# Patient Record
Sex: FEMALE | Race: WHITE | NOT HISPANIC OR LATINO | Employment: OTHER | URBAN - NONMETROPOLITAN AREA
[De-identification: names, ages, dates, MRNs, and addresses within clinical notes are randomized per-mention and may not be internally consistent; named-entity substitution may affect disease eponyms.]

---

## 2021-01-15 ENCOUNTER — IMMUNIZATION (OUTPATIENT)
Dept: VACCINE CLINIC | Facility: HOSPITAL | Age: 56
End: 2021-01-15

## 2021-01-15 PROCEDURE — 0002A: CPT | Performed by: FAMILY MEDICINE

## 2021-01-15 PROCEDURE — 0001A: CPT | Performed by: FAMILY MEDICINE

## 2021-01-15 PROCEDURE — 91300 HC SARSCOV02 VAC 30MCG/0.3ML IM: CPT | Performed by: FAMILY MEDICINE

## 2021-02-05 ENCOUNTER — IMMUNIZATION (OUTPATIENT)
Dept: VACCINE CLINIC | Facility: HOSPITAL | Age: 56
End: 2021-02-05

## 2021-02-05 PROCEDURE — 0002A: CPT | Performed by: INTERNAL MEDICINE

## 2021-02-05 PROCEDURE — 91300 HC SARSCOV02 VAC 30MCG/0.3ML IM: CPT | Performed by: INTERNAL MEDICINE

## 2022-10-25 ENCOUNTER — DOCUMENTATION (OUTPATIENT)
Dept: ONCOLOGY | Facility: HOSPITAL | Age: 57
End: 2022-10-25

## 2022-10-25 NOTE — PROGRESS NOTES
SS received call from patient [phone] stating that she has been diagnosed with Breast Cancer and plans to receive treatment at [facility].    Patient request Dr. Porras for provider.  SS contacted Renetta Monsivais ext [phone] to make aware.    SS will follow as needed.

## 2022-11-09 ENCOUNTER — DOCUMENTATION (OUTPATIENT)
Dept: ONCOLOGY | Facility: HOSPITAL | Age: 57
End: 2022-11-09

## 2022-11-09 NOTE — PROGRESS NOTES
SS received call from patient this date inquiring about doctor appointment.     SS will follow patient for psychosocial needs and resources as needed.

## 2022-11-14 ENCOUNTER — NURSE NAVIGATOR (OUTPATIENT)
Dept: ONCOLOGY | Facility: CLINIC | Age: 57
End: 2022-11-14

## 2022-11-14 NOTE — PROGRESS NOTES
NAME: Ubaldo Thomas    : 1965    DATE OF CONSULTATION: 11/15/2022    REASON FOR REFERRAL: Breast Cancer     REFERRING PHYSICIAN:  Dr. Elva Jackson     CHIEF COMPLAINT:  Breast cancer      HISTORY OF PRESENT ILLNESS:   Ubaldo Thomas is a very pleasant 57 y.o. female who is being seen today in the presence of her daughter and sister-in-law at the request of Dr. Elva Jackson for evaluation and treatment of breast cancer.  She was in her usual state of health when she underwent screening mammogram as below which was abnormal.  She had a biopsy on 2022 revealing an invasive mammary carcinoma, grade 2 with associated DCIS.  Tumor was ER/KY positive and HER2/leonor negative.  She underwent genetic testing through [employer] which showed no genetic mutations.  On 10/17/2022 Dr. Jackson took her for left lumpectomy and sentinel lymph node biopsy.  Small tumor was resected as below and margins were clear for invasive ductal carcinoma, but she did have involvement of the anterior margin by DCIS.  She underwent revision on 10/26/2022 with clearance of margins.  She is referred here for discussion of further therapy.    Ms. Thomas is overall doing well.  She denies weight loss, chest pain, shortness of breath, abdominal pain, nausea, vomiting, diarrhea or constipation.  She denies rectal bleeding.  She denies bone pain, headaches or visual change.    PAST MEDICAL HISTORY:  Past Medical History:   Diagnosis Date   • Bell's palsy    • Breast cancer (HCC)    • Kidney stones    • Skin cancer        Risk Factors:  Age of Menarche: 13 years old  Age of Menopause: 40s  Prior Breast Disease: She has history of fibrocystic breast disease but no history of abnormal mammograms or biopsies  Pregnancies:    Age of 1st pregnancy: 29 years old  Family History of Breast Cancer: None  Family History of Ovarian Cancer: Maternal grandmother had some type of malignancy in her 60s which was possibly ovarian cancer although this  "is uncertain  History of HRT use: None        PAST SURGICAL HISTORY:  Past Surgical History:   Procedure Laterality Date   • BREAST LUMPECTOMY     • KIDNEY STONE SURGERY     - History of removal of melanoma in situ involving right posterior thigh  - 8/14/2022 underwent surgery for left facial nerve reanimation for treatment of Bell's palsy.    FAMILY HISTORY:  Family History   Problem Relation Age of Onset   • Diabetes Mother    • Heart disease Father        SOCIAL HISTORY:  Social History     Socioeconomic History   • Marital status:    Tobacco Use   • Smoking status: Never   Vaping Use   • Vaping Use: Never used   Substance and Sexual Activity   • Alcohol use: Yes     Comment: occasionally   • Drug use: Never   • Sexual activity: Defer         REVIEW OF SYSTEMS:   A comprehensive 14 point review of systems was performed.  Significant findings as mentioned above.  All other systems reviewed and are negative.      MEDICATIONS:  The current medication list was reviewed in the EMR  No current outpatient medications on file.    ALLERGIES:    Allergies   Allergen Reactions   • Dexamethasone Arrhythmia       PHYSICAL EXAM:  Vitals:    11/15/22 1506   BP: 136/81   Pulse: 91   Resp: 20   Temp: 97.7 °F (36.5 °C)   TempSrc: Temporal   SpO2: 100%   Weight: 67.1 kg (148 lb)   Height: 160 cm (63\")   PainSc: 0-No pain     Body surface area is 1.7 meters squared.   Body mass index is 26.22 kg/m².      ECOG score: 0     PHQ-9 Total Score: 0    General:  Awake, alert and oriented, in no distress  HEENT:  Pupils are equal, round and reactive to light and accommodation, Extra-ocular movements full, Oropharyx clear, mucous membranes mois.  She has asymmetry of the face consistent with left sided Bell's palsy.  Neck:  No JVD, thyromegaly or lymphadenopathy  CV:  Regular rate and rhythm, no murmurs, rubs or gallops  Resp:  Lungs are clear to auscultation bilaterally  Breast: Status post left lumpectomy and revision as well as " left sentinel lymph node biopsy.  She has some firmer tissue around the sites of her surgeries as well as what appears to be an old biopsy site.  All is healing well.  No mass lesions.  No left axillary adenopathy.  Right breast without mass lesions.  Right axilla without adenopathy.  Abd:  Soft, non-tender, non-distended, bowel sounds present, no organomegaly or masses  Ext:  No clubbing, cyanosis or edema  Lymph:  No cervical, supraclavicular, axillary, inguinal or femoral adenopathy  Neuro:  MS as above, CN II-XII intact, grossly non-focal exam      PATHOLOGY:    09/28/2022            FISH Analysis 09/28/2022      10/17/2022      10/26/2022      Genetic Testing 11/07/2022          ENDOSCOPY:        IMAGING:    Bilateral Diagnostic Mammogram with Bilateral Breast Ultrasound ([facility]) 09/27/2022          Bilateral Breast MRI ([facility]) 09/30/2022          RECENT LABS:  Lab Results   Component Value Date    WBC 7.79 11/15/2022    HGB 14.8 11/15/2022    HCT 44.9 11/15/2022    MCV 93.5 11/15/2022    RDW 12.8 11/15/2022     11/15/2022    NEUTRORELPCT 60.4 11/15/2022    LYMPHORELPCT 27.9 11/15/2022    MONORELPCT 8.9 11/15/2022    EOSRELPCT 1.9 11/15/2022    BASORELPCT 0.6 11/15/2022    NEUTROABS 4.71 11/15/2022    LYMPHSABS 2.17 11/15/2022       Lab Results   Component Value Date     11/15/2022    K 3.9 11/15/2022    CO2 24.1 11/15/2022     11/15/2022    BUN 13 11/15/2022    CREATININE 0.72 11/15/2022    GLUCOSE 105 (H) 11/15/2022    CALCIUM 9.8 11/15/2022    ALKPHOS 108 11/15/2022    AST 22 11/15/2022    ALT 21 11/15/2022    BILITOT 0.5 11/15/2022    ALBUMIN 4.77 11/15/2022    PROTEINTOT 7.9 11/15/2022           ASSESSMENT & PLAN:  Ubaldo Thomas is a very pleasant 57 y.o. female with recently diagnosed Stage IA (pT1bN0(sn)MX) moderately differentiated invasive ductal carcinoma of the L breast.  Tumor was 6 mm in maximal dimension with associated DCIS.  Initial margin positive for DCIS with revision margins  now clear.  0/2 SLN involved.  Tumor was 88% 3+ ER+, 86 % 3+ SD+ and HER-2/Frances Negative (1+) with negative FISH testing.    1.  Left Breast Cancer:  -  S/p L Lumpectomy 10-17-22 with SLNBx with revision of margin on 10-26-22 for involvement with DCIS.  Surgical margins clear.    -  Overall her tumor has favorable characteristics including strong hormonal positivity.  -  Given tumor size just >5 mm, I have recommended we send genomic profiling to determine whether risk is sufficient to necessitate treatment with chemotherapy.  If high risk, would recommend 4 cycles of TC . If low risk, would proceed to radiation (could potentially be a candidate for accelerated partial breast irradiation) and will then require hormonal therapy after radiation.  -  Will order baseline labwork today.'  - Will order baseline DEXA scan in preparation for hormonal therapy    2. Prophylaxis:  -  Ubaldo Thomas received 2022 flu vaccine.  -  Ubaldo Thomas has not had Prevnar vaccine.  -  Ubaldo Thomas received 4 doses COVID vaccine. and received bivalent booster.  -  Ubaldo Thomas has had colonoscopy x 2 in the past (doesn't remember when and doesn't remember the result).  She says her PCP, Dr. Angelo Kingston would have a record of c-scopes which she says were performed by Dr. Archibald.  Will see if we can get a copy of records.    3.  ACO / KIARA/Other  Quality measures  -  Ubaldo Thomas received 2022 flu vaccine.  -  Ubaldo Thomas reports a pain score of 0.  Given her pain assessment as noted, treatment options were discussed and the following options were decided upon as a follow-up plan to address the patient's pain: No intervention needed at this time..  -  Current outpatient and discharge medications have been reconciled for the patient.  Reviewed by: Nidhi Porras MD     4.  F/u:  -  Check baseline labwork today  -  Check DEXA scan  -  Send tumor for Mammaprint testing.  -  Return in 2-3 weeks after Mammaprint has resulted  -   Attempt to get copy of C-scope records.       I spent 60 minutes with Ubaldo Thomas today.  In the office today, more than 50% of this time was spent face-to-face with her  in counseling / coordination of care, reviewing her medical history and counseling on the current treatment plan.  All questions were answered to her satisfaction      Electronically Signed by: Nidhi Porras MD      CC:   MD Angelo Us MD

## 2022-11-14 NOTE — PROGRESS NOTES
Called patient on this day. Patient has an upcoming appointment with Dr. Porras, and Nurse Navigator will not be able to attend. The role of the NN was introduced to patient.  I let patient know what to expect during visit and that we offer support groups for breast cancer patients. NN contact information was provided and encouraged patient to call with any questions or concerns. Patient verbalized understanding and was appreciative of the call. NN will follow and assist PRN.

## 2022-11-15 ENCOUNTER — CONSULT (OUTPATIENT)
Dept: ONCOLOGY | Facility: CLINIC | Age: 57
End: 2022-11-15

## 2022-11-15 VITALS
BODY MASS INDEX: 26.22 KG/M2 | SYSTOLIC BLOOD PRESSURE: 136 MMHG | HEART RATE: 91 BPM | OXYGEN SATURATION: 100 % | WEIGHT: 148 LBS | TEMPERATURE: 97.7 F | DIASTOLIC BLOOD PRESSURE: 81 MMHG | HEIGHT: 63 IN | RESPIRATION RATE: 20 BRPM

## 2022-11-15 DIAGNOSIS — C50.212 MALIGNANT NEOPLASM OF UPPER-INNER QUADRANT OF LEFT BREAST IN FEMALE, ESTROGEN RECEPTOR POSITIVE: Primary | ICD-10-CM

## 2022-11-15 DIAGNOSIS — Z17.0 MALIGNANT NEOPLASM OF LEFT BREAST IN FEMALE, ESTROGEN RECEPTOR POSITIVE, UNSPECIFIED SITE OF BREAST: Primary | ICD-10-CM

## 2022-11-15 DIAGNOSIS — C50.912 MALIGNANT NEOPLASM OF LEFT BREAST IN FEMALE, ESTROGEN RECEPTOR POSITIVE, UNSPECIFIED SITE OF BREAST: Primary | ICD-10-CM

## 2022-11-15 DIAGNOSIS — E55.9 VITAMIN D DEFICIENCY: ICD-10-CM

## 2022-11-15 DIAGNOSIS — R53.83 FATIGUE, UNSPECIFIED TYPE: ICD-10-CM

## 2022-11-15 DIAGNOSIS — Z17.0 MALIGNANT NEOPLASM OF UPPER-INNER QUADRANT OF LEFT BREAST IN FEMALE, ESTROGEN RECEPTOR POSITIVE: Primary | ICD-10-CM

## 2022-11-15 LAB
ALBUMIN SERPL-MCNC: 4.77 G/DL (ref 3.5–5.2)
ALBUMIN/GLOB SERPL: 1.5 G/DL
ALP SERPL-CCNC: 108 U/L (ref 39–117)
ALT SERPL W P-5'-P-CCNC: 21 U/L (ref 1–33)
ANION GAP SERPL CALCULATED.3IONS-SCNC: 13.9 MMOL/L (ref 5–15)
AST SERPL-CCNC: 22 U/L (ref 1–32)
BASOPHILS # BLD AUTO: 0.05 10*3/MM3 (ref 0–0.2)
BASOPHILS NFR BLD AUTO: 0.6 % (ref 0–1.5)
BILIRUB SERPL-MCNC: 0.5 MG/DL (ref 0–1.2)
BUN SERPL-MCNC: 13 MG/DL (ref 6–20)
BUN/CREAT SERPL: 18.1 (ref 7–25)
CALCIUM SPEC-SCNC: 9.8 MG/DL (ref 8.6–10.5)
CHLORIDE SERPL-SCNC: 102 MMOL/L (ref 98–107)
CO2 SERPL-SCNC: 24.1 MMOL/L (ref 22–29)
CREAT SERPL-MCNC: 0.72 MG/DL (ref 0.57–1)
DEPRECATED RDW RBC AUTO: 43.8 FL (ref 37–54)
EGFRCR SERPLBLD CKD-EPI 2021: 97.1 ML/MIN/1.73
EOSINOPHIL # BLD AUTO: 0.15 10*3/MM3 (ref 0–0.4)
EOSINOPHIL NFR BLD AUTO: 1.9 % (ref 0.3–6.2)
ERYTHROCYTE [DISTWIDTH] IN BLOOD BY AUTOMATED COUNT: 12.8 % (ref 12.3–15.4)
FERRITIN SERPL-MCNC: 129.4 NG/ML (ref 13–150)
GLOBULIN UR ELPH-MCNC: 3.1 GM/DL
GLUCOSE SERPL-MCNC: 105 MG/DL (ref 65–99)
HCT VFR BLD AUTO: 44.9 % (ref 34–46.6)
HGB BLD-MCNC: 14.8 G/DL (ref 12–15.9)
IMM GRANULOCYTES # BLD AUTO: 0.02 10*3/MM3 (ref 0–0.05)
IMM GRANULOCYTES NFR BLD AUTO: 0.3 % (ref 0–0.5)
IRON 24H UR-MRATE: 57 MCG/DL (ref 37–145)
IRON SATN MFR SERPL: 16 % (ref 20–50)
LYMPHOCYTES # BLD AUTO: 2.17 10*3/MM3 (ref 0.7–3.1)
LYMPHOCYTES NFR BLD AUTO: 27.9 % (ref 19.6–45.3)
MCH RBC QN AUTO: 30.8 PG (ref 26.6–33)
MCHC RBC AUTO-ENTMCNC: 33 G/DL (ref 31.5–35.7)
MCV RBC AUTO: 93.5 FL (ref 79–97)
MONOCYTES # BLD AUTO: 0.69 10*3/MM3 (ref 0.1–0.9)
MONOCYTES NFR BLD AUTO: 8.9 % (ref 5–12)
NEUTROPHILS NFR BLD AUTO: 4.71 10*3/MM3 (ref 1.7–7)
NEUTROPHILS NFR BLD AUTO: 60.4 % (ref 42.7–76)
NRBC BLD AUTO-RTO: 0 /100 WBC (ref 0–0.2)
PLATELET # BLD AUTO: 319 10*3/MM3 (ref 140–450)
PMV BLD AUTO: 9.7 FL (ref 6–12)
POTASSIUM SERPL-SCNC: 3.9 MMOL/L (ref 3.5–5.2)
PROT SERPL-MCNC: 7.9 G/DL (ref 6–8.5)
RBC # BLD AUTO: 4.8 10*6/MM3 (ref 3.77–5.28)
SODIUM SERPL-SCNC: 140 MMOL/L (ref 136–145)
TIBC SERPL-MCNC: 350 MCG/DL (ref 298–536)
TRANSFERRIN SERPL-MCNC: 235 MG/DL (ref 200–360)
TSH SERPL DL<=0.05 MIU/L-ACNC: 2.43 UIU/ML (ref 0.27–4.2)
WBC NRBC COR # BLD: 7.79 10*3/MM3 (ref 3.4–10.8)

## 2022-11-15 PROCEDURE — 84466 ASSAY OF TRANSFERRIN: CPT | Performed by: INTERNAL MEDICINE

## 2022-11-15 PROCEDURE — 99205 OFFICE O/P NEW HI 60 MIN: CPT | Performed by: INTERNAL MEDICINE

## 2022-11-15 PROCEDURE — 80050 GENERAL HEALTH PANEL: CPT | Performed by: INTERNAL MEDICINE

## 2022-11-15 PROCEDURE — 86300 IMMUNOASSAY TUMOR CA 15-3: CPT | Performed by: INTERNAL MEDICINE

## 2022-11-15 PROCEDURE — 83540 ASSAY OF IRON: CPT | Performed by: INTERNAL MEDICINE

## 2022-11-15 PROCEDURE — 82607 VITAMIN B-12: CPT | Performed by: INTERNAL MEDICINE

## 2022-11-15 PROCEDURE — 82306 VITAMIN D 25 HYDROXY: CPT | Performed by: INTERNAL MEDICINE

## 2022-11-15 PROCEDURE — 82728 ASSAY OF FERRITIN: CPT | Performed by: INTERNAL MEDICINE

## 2022-11-15 PROCEDURE — 82746 ASSAY OF FOLIC ACID SERUM: CPT | Performed by: INTERNAL MEDICINE

## 2022-11-16 LAB
25(OH)D3 SERPL-MCNC: 28.8 NG/ML (ref 30–100)
CANCER AG15-3 SERPL-ACNC: 26.2 U/ML
FOLATE SERPL-MCNC: 9.82 NG/ML (ref 4.78–24.2)
VIT B12 BLD-MCNC: 594 PG/ML (ref 211–946)

## 2022-11-16 RX ORDER — ERGOCALCIFEROL 1.25 MG/1
50000 CAPSULE ORAL WEEKLY
Qty: 4 CAPSULE | Refills: 5 | Status: SHIPPED | OUTPATIENT
Start: 2022-11-16 | End: 2023-02-21 | Stop reason: SDUPTHER

## 2022-11-17 LAB — CANCER AG27-29 SERPL-ACNC: 31.1 U/ML (ref 0–38.6)

## 2022-12-08 ENCOUNTER — HOSPITAL ENCOUNTER (OUTPATIENT)
Dept: BONE DENSITY | Facility: HOSPITAL | Age: 57
Discharge: HOME | End: 2022-12-08
Admitting: INTERNAL MEDICINE

## 2022-12-08 DIAGNOSIS — Z17.0 MALIGNANT NEOPLASM OF LEFT BREAST IN FEMALE, ESTROGEN RECEPTOR POSITIVE, UNSPECIFIED SITE OF BREAST: ICD-10-CM

## 2022-12-08 DIAGNOSIS — C50.912 MALIGNANT NEOPLASM OF LEFT BREAST IN FEMALE, ESTROGEN RECEPTOR POSITIVE, UNSPECIFIED SITE OF BREAST: ICD-10-CM

## 2022-12-08 PROCEDURE — 77080 DXA BONE DENSITY AXIAL: CPT

## 2022-12-08 PROCEDURE — 77080 DXA BONE DENSITY AXIAL: CPT | Performed by: RADIOLOGY

## 2022-12-08 NOTE — PROGRESS NOTES
NAME: Ubaldo Thomas    : 1965    DATE OF CONSULTATION: 2022    DIAGNOSIS:   Stage IA (pT1bN0(sn)MX) moderately differentiated invasive ductal carcinoma of the L breast.  Tumor was 6 mm in maximal dimension with associated DCIS.  Initial margin positive for DCIS with revision margins now clear.  0/2 SLN involved.  Tumor was 88% 3+ ER+, 86 % 3+ NV+ and HER-2/Frances Negative (1+) with negative FISH testing.  Mammaprint Low risk Luminal-Type A.      TREATMENT HISTORY:    1.  L Lumpectomy, SLNBx (Dr. Jackson) 10-17-22  2.  Revision for clearance of margins 10-26-22      CHIEF COMPLAINT:  Breast cancer      HISTORY OF PRESENT ILLNESS:   Ubaldo Thomas is a very pleasant 57 y.o. female who was referred by Dr. Elva Jackson for evaluation and treatment of breast cancer.  She was in her usual state of health when she underwent screening mammogram as below which was abnormal.  She had a biopsy on 2022 revealing an invasive mammary carcinoma, grade 2 with associated DCIS.  Tumor was ER/NV positive and HER2/frances negative.  She underwent genetic testing through [employer] which showed no genetic mutations.  On 10/17/2022 Dr. Jackson took her for left lumpectomy and sentinel lymph node biopsy.  Small tumor was resected as below and margins were clear for invasive ductal carcinoma, but she did have involvement of the anterior margin by DCIS.  She underwent revision on 10/26/2022 with clearance of margins.  She is referred here for discussion of further therapy.    Ms. Thomas is overall doing well.  She denies weight loss, chest pain, shortness of breath, abdominal pain, nausea, vomiting, diarrhea or constipation.  She denies rectal bleeding.  She denies bone pain, headaches or visual change.      INTERVAL HISTORY:   Ms. Thomas is here today with her daughter and SALVATORE for fu of breast cancer.  Since her last visit, Mammaprint testing returned showing a low risk tumor.  DEXA scan showed osteoporosis with T score  -3.1.  She is feeling well though she said she was very anxious about today's appt and is relieved that she will not need to take chemotherapy.      PAST MEDICAL HISTORY:  Past Medical History:   Diagnosis Date   • Bell's palsy    • Breast cancer (HCC)    • Kidney stones    • Skin cancer        Risk Factors:  Age of Menarche: 13 years old  Age of Menopause: 40s  Prior Breast Disease: She has history of fibrocystic breast disease but no history of abnormal mammograms or biopsies  Pregnancies:    Age of 1st pregnancy: 29 years old  Family History of Breast Cancer: None  Family History of Ovarian Cancer: Maternal grandmother had some type of malignancy in her 60s which was possibly ovarian cancer although this is uncertain  History of HRT use: None        PAST SURGICAL HISTORY:  Past Surgical History:   Procedure Laterality Date   • BREAST LUMPECTOMY     • KIDNEY STONE SURGERY     - History of removal of melanoma in situ involving right posterior thigh  - 2022 underwent surgery for left facial nerve reanimation for treatment of Bell's palsy.    FAMILY HISTORY:  Family History   Problem Relation Age of Onset   • Diabetes Mother    • Heart disease Father        SOCIAL HISTORY:  Social History     Socioeconomic History   • Marital status:    Tobacco Use   • Smoking status: Never   Vaping Use   • Vaping Use: Never used   Substance and Sexual Activity   • Alcohol use: Yes     Comment: occasionally   • Drug use: Never   • Sexual activity: Defer         REVIEW OF SYSTEMS:   A comprehensive 14 point review of systems was performed.  Significant findings as mentioned above.  All other systems reviewed and are negative.      MEDICATIONS:  The current medication list was reviewed in the EMR    Current Outpatient Medications:   •  vitamin D (ERGOCALCIFEROL) 1.25 MG (42775 UT) capsule capsule, Take 1 capsule by mouth 1 (One) Time Per Week., Disp: 4 capsule, Rfl: 5    ALLERGIES:    Allergies   Allergen Reactions   •  "Dexamethasone Arrhythmia       PHYSICAL EXAM:  Vitals:    12/09/22 1332   BP: 125/88   Pulse: 97   Resp: 18   Temp: 97.3 °F (36.3 °C)   SpO2: 99%   Weight: 66.2 kg (146 lb)   Height: 160 cm (63\")   PainSc: 0-No pain     Body surface area is 1.69 meters squared.   Body mass index is 25.86 kg/m².            General:  Awake, alert and oriented, appears well.  HEENT:  Pupils are equal, round and reactive to light and accommodation, Extra-ocular movements full, Oropharyx clear, mucous membranes mois.  She has asymmetry of the face consistent with left sided Bell's palsy.  Neck:  No JVD, thyromegaly or lymphadenopathy  CV:  Regular rate and rhythm, no murmurs, rubs or gallops  Resp:  Lungs are clear to auscultation bilaterally, no wheezing  Breast: Status post left lumpectomy and revision as well as left sentinel lymph node biopsy.  She has some firmer tissue around the sites of her surgeries as well as what appears to be an old biopsy site.  All is healing well.  No mass lesions.  No left axillary adenopathy.  Right breast without mass lesions.  Right axilla without adenopathy.  Abd:  Soft, non-tender, non-distended, bowel sounds present, no organomegaly or masses  Ext:  No clubbing, cyanosis, no lower extremity edema  Lymph:  No cervical, supraclavicular, axillary, inguinal or femoral adenopathy  Neuro:  MS as above, CN II-XII intact, grossly non-focal exam      PATHOLOGY:    09/28/2022            FISH Analysis 09/28/2022      10/17/2022      10/26/2022      Genetic Testing 11/07/2022          ENDOSCOPY:    Colonoscopy 10/16/17        IMAGING:    Bilateral Diagnostic Mammogram with Bilateral Breast Ultrasound ([facility]) 09/27/2022        Bilateral Breast MRI ([facility]) 09/30/2022      Mammoprint Results 11/28/22      DEXA Bone Density Axial (12/08/2022 14:11)  FINDINGS: The BMD measured at the AP spine L1-L4 is 0.808 gm/cm2 with a  T-score of -3.1.      IMPRESSION:  The patient is considered to be osteoporotic according to " the  World Health Organization criteria. Fracture risk is high.     RECENT LABS:  Lab Results   Component Value Date    WBC 7.79 11/15/2022    HGB 14.8 11/15/2022    HCT 44.9 11/15/2022    MCV 93.5 11/15/2022    RDW 12.8 11/15/2022     11/15/2022    NEUTRORELPCT 60.4 11/15/2022    LYMPHORELPCT 27.9 11/15/2022    MONORELPCT 8.9 11/15/2022    EOSRELPCT 1.9 11/15/2022    BASORELPCT 0.6 11/15/2022    NEUTROABS 4.71 11/15/2022    LYMPHSABS 2.17 11/15/2022       Lab Results   Component Value Date     11/15/2022    K 3.9 11/15/2022    CO2 24.1 11/15/2022     11/15/2022    BUN 13 11/15/2022    CREATININE 0.72 11/15/2022    GLUCOSE 105 (H) 11/15/2022    CALCIUM 9.8 11/15/2022    ALKPHOS 108 11/15/2022    AST 22 11/15/2022    ALT 21 11/15/2022    BILITOT 0.5 11/15/2022    ALBUMIN 4.77 11/15/2022    PROTEINTOT 7.9 11/15/2022     Lab Results   Component Value Date    FERRITIN 129.40 11/15/2022    IRON 57 11/15/2022    TIBC 350 11/15/2022    LABIRON 16 (L) 11/15/2022    OEQKWZIB23 594 11/15/2022    FOLATE 9.82 11/15/2022     Lab Results   Component Value Date    TSH 2.430 11/15/2022    NSUP65FN 28.8 (L) 11/15/2022     Lab Results   Component Value Date     26.2 (H) 11/15/2022     Lab Results   Component Value Date    LABCA2 31.1 11/15/2022           ASSESSMENT & PLAN:  Ubaldo Thomas is a very pleasant 57 y.o. female with recently diagnosed Stage IA (pT1bN0(sn)MX) moderately differentiated invasive ductal carcinoma of the L breast.  Tumor was 6 mm in maximal dimension with associated DCIS.  Initial margin positive for DCIS with revision margins now clear.  0/2 SLN involved.  Tumor was 88% 3+ ER+, 86 % 3+ SC+ and HER-2/Frances Negative (1+) with negative FISH testing.  Mammaprint Low Risk Luminal-Type A.    1.  Left Breast Cancer:  -  S/p L Lumpectomy 10-17-22 with SLNBx with revision of margin on 10-26-22 for involvement with DCIS.  Surgical margins clear.    -  Overall her tumor has favorable characteristics  including strong hormonal positivity.  -  Given tumor size just >5 mm, I have recommended we send genomic profiling to determine whether risk is sufficient to necessitate treatment with chemotherapy. Happily, testing returned as low risk so chemotherapy should not be necessary.  -  Now she will require adjuvant radiation.  I think she may be a candidate for accelerated partial breast irradiation.  Will refer her to Dr. Claudia Mccrary for consultation.   -  Following radiation, she will require hormonal therapy.  Baseline DEXA 12-08-22 showed osteoporosis.      2.  Osteoporosis:  -  I have recommended treatment with Prolia. She says she is to see her dentist in January and could proceed after that.  Will work on approvals.     3. Prophylaxis:  -  Ubaldo Thomas received 2022 flu vaccine.  -  Ubaldo Thomas has not had Prevnar vaccine.  -  Ubaldo Thomas received 4 doses COVID vaccine. and received bivalent booster.  -  Ubaldo Thomas has had colonoscopy x 2 in the past (doesn't remember when and doesn't remember the result).  She says her PCP, Dr. Angelo Kingston would have a record of c-scopes which she says were performed by Dr. Archibald.  Will again see if we can get a copy of records.    4.  ACO / KIARA/Other  Quality measures  -  Ubaldo Thomas received 2022 flu vaccine.  -  Ubaldo Thomas reports a pain score of 0.  Given her pain assessment as noted, treatment options were discussed and the following options were decided upon as a follow-up plan to address the patient's pain: No intervention needed at this time..  -  Current outpatient and discharge medications have been reconciled for the patient.  Reviewed by: Nidhi Porras MD        4.  F/u:  -  Refer for adjuvant radiation  -  She will see her dentist and I will work on approvals for prolia.  -  Return in January to discuss adjuvant hormonal therapy.  -  Attempt to get copy of C-scope records.       I spent 30 minutes with Ubaldo Thomas today.  In the office  today, more than 50% of this time was spent face-to-face with her  in counseling / coordination of care, reviewing her medical history and counseling on the current treatment plan.  All questions were answered to her satisfaction      Electronically Signed by: Nidhi Porras MD

## 2022-12-09 ENCOUNTER — OFFICE VISIT (OUTPATIENT)
Dept: ONCOLOGY | Facility: CLINIC | Age: 57
End: 2022-12-09

## 2022-12-09 VITALS
DIASTOLIC BLOOD PRESSURE: 88 MMHG | HEART RATE: 97 BPM | WEIGHT: 146 LBS | TEMPERATURE: 97.3 F | RESPIRATION RATE: 18 BRPM | SYSTOLIC BLOOD PRESSURE: 125 MMHG | OXYGEN SATURATION: 99 % | HEIGHT: 63 IN | BODY MASS INDEX: 25.87 KG/M2

## 2022-12-09 DIAGNOSIS — Z17.0 MALIGNANT NEOPLASM OF UPPER-INNER QUADRANT OF LEFT BREAST IN FEMALE, ESTROGEN RECEPTOR POSITIVE: ICD-10-CM

## 2022-12-09 DIAGNOSIS — Z78.0 POSTMENOPAUSAL: ICD-10-CM

## 2022-12-09 DIAGNOSIS — Z17.0 MALIGNANT NEOPLASM OF LEFT BREAST IN FEMALE, ESTROGEN RECEPTOR POSITIVE, UNSPECIFIED SITE OF BREAST: ICD-10-CM

## 2022-12-09 DIAGNOSIS — Z17.0 MALIGNANT NEOPLASM OF LEFT BREAST IN FEMALE, ESTROGEN RECEPTOR POSITIVE, UNSPECIFIED SITE OF BREAST: Primary | ICD-10-CM

## 2022-12-09 DIAGNOSIS — C50.212 MALIGNANT NEOPLASM OF UPPER-INNER QUADRANT OF LEFT BREAST IN FEMALE, ESTROGEN RECEPTOR POSITIVE: ICD-10-CM

## 2022-12-09 DIAGNOSIS — M81.0 AGE-RELATED OSTEOPOROSIS WITHOUT CURRENT PATHOLOGICAL FRACTURE: Primary | ICD-10-CM

## 2022-12-09 DIAGNOSIS — C50.912 MALIGNANT NEOPLASM OF LEFT BREAST IN FEMALE, ESTROGEN RECEPTOR POSITIVE, UNSPECIFIED SITE OF BREAST: ICD-10-CM

## 2022-12-09 DIAGNOSIS — C50.912 MALIGNANT NEOPLASM OF LEFT BREAST IN FEMALE, ESTROGEN RECEPTOR POSITIVE, UNSPECIFIED SITE OF BREAST: Primary | ICD-10-CM

## 2022-12-09 PROCEDURE — 99214 OFFICE O/P EST MOD 30 MIN: CPT | Performed by: INTERNAL MEDICINE

## 2022-12-11 PROBLEM — Z78.0 POSTMENOPAUSAL: Status: ACTIVE | Noted: 2022-12-11

## 2022-12-11 PROBLEM — M81.0 AGE-RELATED OSTEOPOROSIS WITHOUT CURRENT PATHOLOGICAL FRACTURE: Status: ACTIVE | Noted: 2022-12-11

## 2022-12-19 ENCOUNTER — OFFICE VISIT (OUTPATIENT)
Dept: RADIATION ONCOLOGY | Facility: HOSPITAL | Age: 57
End: 2022-12-19

## 2022-12-19 ENCOUNTER — HOSPITAL ENCOUNTER (OUTPATIENT)
Dept: RADIATION ONCOLOGY | Facility: HOSPITAL | Age: 57
Setting detail: RADIATION/ONCOLOGY SERIES
Discharge: HOME | End: 2022-12-19
Payer: COMMERCIAL

## 2022-12-19 VITALS
WEIGHT: 147.5 LBS | TEMPERATURE: 98.4 F | HEIGHT: 63 IN | OXYGEN SATURATION: 98 % | RESPIRATION RATE: 18 BRPM | SYSTOLIC BLOOD PRESSURE: 125 MMHG | DIASTOLIC BLOOD PRESSURE: 84 MMHG | BODY MASS INDEX: 26.13 KG/M2 | HEART RATE: 84 BPM

## 2022-12-19 DIAGNOSIS — C50.212 MALIGNANT NEOPLASM OF UPPER-INNER QUADRANT OF LEFT BREAST IN FEMALE, ESTROGEN RECEPTOR POSITIVE: Primary | ICD-10-CM

## 2022-12-19 DIAGNOSIS — Z17.0 MALIGNANT NEOPLASM OF UPPER-INNER QUADRANT OF LEFT BREAST IN FEMALE, ESTROGEN RECEPTOR POSITIVE: Primary | ICD-10-CM

## 2022-12-19 RX ORDER — ACYCLOVIR 800 MG/1
800 TABLET ORAL
COMMUNITY
Start: 2022-08-15 | End: 2023-02-21

## 2022-12-19 NOTE — PROGRESS NOTES
CONSULTATION NOTE    NAME:      Ubaldo Thomas  :                                                          1965  DATE OF CONSULTATION:                       22  REQUESTING PHYSICIAN:                   Nidhi Porras MD  REASON FOR CONSULTATION:           Pathologic T1b N0 M0 stage I invasive ductal carcinoma of the left breast upper inner quadrant       BRIEF HISTORY:  Ubaldo Thomas  is a very pleasant 57 y.o. female diagnosed with a moderately differentiated invasive ductal carcinoma the left breast measuring 6 mm.  Initial margins were positive for DCIS and final margins were negative.  0/2 sentinel nodes were positive.  Tumor was ER/MT positive HER2/leonor negative.   She is interested in accelerated partial breast irradiation.  Final margins were negative by at least 4 mm.  Ms. Thomas had facial surgery in August for [place] Palsy which she was afflicted with in 2021.      Age at menarche:  13  Age at menopause:  47  Hormone replacement therapy:  no  Personal history of breast cancer:  no  Family history of breast cancer:  no  Radiation to chest before age of 29:  no  Age of first live birth:  29        BMI:  Body mass index is 26.13 kg/m².      Social History     Substance and Sexual Activity   Alcohol Use Yes    Comment: occasionally       Allergies   Allergen Reactions   • Dexamethasone Arrhythmia       Social History     Tobacco Use   • Smoking status: Never   • Smokeless tobacco: Never   Vaping Use   • Vaping Use: Never used   Substance Use Topics   • Alcohol use: Yes     Comment: occasionally   • Drug use: Never         Past Medical History:   Diagnosis Date   • Bell's palsy    • Breast cancer (HCC)    • Kidney stones    • Skin cancer     Melanoma InSitu       family history includes Diabetes in her mother; Heart disease in her father.     Past Surgical History:   Procedure Laterality Date   • BREAST LUMPECTOMY Left 10/17/2022   • KIDNEY STONE SURGERY          Review of  "Systems   All other systems reviewed and are negative.          Objective   VITAL SIGNS:   Vitals:    12/19/22 1306   BP: 125/84   Pulse: 84   Resp: 18   Temp: 98.4 °F (36.9 °C)   TempSrc: Temporal   SpO2: 98%   Weight: 66.9 kg (147 lb 8 oz)   Height: 160 cm (63\")   PainSc: 0-No pain        KPS    90%    Physical Exam  Vitals reviewed.   Constitutional:       Appearance: Normal appearance. She is normal weight.   HENT:      Mouth/Throat:      Comments: Left facial weakness  Cardiovascular:      Rate and Rhythm: Normal rate and regular rhythm.      Heart sounds: Normal heart sounds.   Pulmonary:      Effort: Pulmonary effort is normal.      Breath sounds: Normal breath sounds.   Neurological:      Mental Status: She is alert.     The breast exam reveals 2 well-healed surgical incision in the 12 o'clock position of the left breast.  One is adjacent to the nipple complex at 1 higher.  There are some puckering of the breast.  The left breast is smaller than the right.  She has no masses or suspicious lesions in either breast and no axillary adenopathy.         The following portions of the patient's history were reviewed and updated as appropriate: allergies, current medications, past family history, past medical history, past social history, past surgical history and problem list.    Assessment      IMPRESSION:    Ubaldo Thomas  is a 57 y.o. female diagnosed with a moderately differentiated invasive ductal carcinoma the left breast measuring 6 mm.  Initial margins were positive for DCIS and final margins were negative.  0/2 sentinel nodes were positive.  Tumor was ER/CT positive HER2/leonor negative.   She is interested in accelerated partial breast irradiation.  Final margins were negative by at least 4 mm.        RECOMMENDATIONS: Ms. Thomas appears to be a good candidate for accelerated partial breast radiation.  The pros and cons, risks and benefits of treatment were discussed and informed consent obtained.  " She will return for treatment planning and we will treat to 30 Martin in 5 fractions every other day.  Thank you for asking me to see this very pleasant patient               Claudia Mccrary MD    Total time of patient care on day of service including time prior to, face to face with patient, and following visit spent in reviewing records, lab results, imaging studies, discussion with patient, and documentation/charting was > 45 minutes.    Errors in dictation may reflect use of voice recognition software and not all errors in transcription may have been detected prior to signing.

## 2022-12-20 ENCOUNTER — APPOINTMENT (OUTPATIENT)
Dept: BONE DENSITY | Facility: HOSPITAL | Age: 57
End: 2022-12-20

## 2022-12-29 ENCOUNTER — HOSPITAL ENCOUNTER (OUTPATIENT)
Dept: RADIATION ONCOLOGY | Facility: HOSPITAL | Age: 57
Discharge: HOME | End: 2022-12-29

## 2022-12-29 PROCEDURE — 77290 THER RAD SIMULAJ FIELD CPLX: CPT | Performed by: RADIOLOGY

## 2022-12-29 PROCEDURE — 77333 RADIATION TREATMENT AID(S): CPT | Performed by: RADIOLOGY

## 2023-01-03 ENCOUNTER — HOSPITAL ENCOUNTER (OUTPATIENT)
Dept: RADIATION ONCOLOGY | Facility: HOSPITAL | Age: 58
Setting detail: RADIATION/ONCOLOGY SERIES
Discharge: HOME | End: 2023-01-03
Payer: COMMERCIAL

## 2023-01-05 ENCOUNTER — TELEPHONE (OUTPATIENT)
Dept: ONCOLOGY | Facility: CLINIC | Age: 58
End: 2023-01-05

## 2023-01-05 NOTE — TELEPHONE ENCOUNTER
Caller: Ubaldo Thomas    Relationship: Self    Best call back number: [phone]    What is the best time to reach you: ANYTIME     Who are you requesting to speak with (clinical staff, provider,  specific staff member): CLINICAL    What was the call regarding: PT FAXING OVER A LETTER FROM HER DENTIST TO DR WALDROP THAT SHE CAN TAKE THE PROLIA INJECTION    Do you require a callback: ONLY IF QUESTIONS

## 2023-01-06 PROCEDURE — 77338 DESIGN MLC DEVICE FOR IMRT: CPT | Performed by: RADIOLOGY

## 2023-01-06 PROCEDURE — 77301 RADIOTHERAPY DOSE PLAN IMRT: CPT | Performed by: RADIOLOGY

## 2023-01-06 PROCEDURE — 77300 RADIATION THERAPY DOSE PLAN: CPT | Performed by: RADIOLOGY

## 2023-01-09 ENCOUNTER — TELEPHONE (OUTPATIENT)
Dept: RADIATION ONCOLOGY | Facility: HOSPITAL | Age: 58
End: 2023-01-09
Payer: COMMERCIAL

## 2023-01-09 NOTE — TELEPHONE ENCOUNTER
Returned pt's call & received an update on approval for PA from pt's insurance; will verify when pt will start XRT since her plan has been completed & how many treatments Dr. Mccrary is recommending; will call pt with update once received; pt agreed.

## 2023-01-10 ENCOUNTER — TELEPHONE (OUTPATIENT)
Dept: ONCOLOGY | Facility: CLINIC | Age: 58
End: 2023-01-10

## 2023-01-10 NOTE — TELEPHONE ENCOUNTER
Caller: Ubaldo Thomas    Relationship to patient: Self    Best call back number: [phone]    Chief complaint: CANC./RERSCH., DUE TO HAVING RADIATION IN [place] & SHE IS STAYING THERE SO NEEDS TO RAMIREZ. WHEN SHE IS BACK HOME.    Type of visit: LAB/FOLLOW UP 2    Requested date: AFTER 1/27/2023    If rescheduling, when is the original appointment: 1/20/2023

## 2023-01-18 ENCOUNTER — HOSPITAL ENCOUNTER (OUTPATIENT)
Dept: RADIATION ONCOLOGY | Facility: HOSPITAL | Age: 58
Discharge: HOME | End: 2023-01-18

## 2023-01-18 PROCEDURE — 77385: CPT | Performed by: RADIOLOGY

## 2023-01-19 PROCEDURE — 77336 RADIATION PHYSICS CONSULT: CPT | Performed by: RADIOLOGY

## 2023-01-20 ENCOUNTER — HOSPITAL ENCOUNTER (OUTPATIENT)
Dept: RADIATION ONCOLOGY | Facility: HOSPITAL | Age: 58
Discharge: HOME | End: 2023-01-20

## 2023-01-20 PROCEDURE — 77385: CPT | Performed by: RADIOLOGY

## 2023-01-23 ENCOUNTER — HOSPITAL ENCOUNTER (OUTPATIENT)
Dept: RADIATION ONCOLOGY | Facility: HOSPITAL | Age: 58
Discharge: HOME | End: 2023-01-23

## 2023-01-23 PROCEDURE — 77385: CPT | Performed by: RADIOLOGY

## 2023-01-25 ENCOUNTER — HOSPITAL ENCOUNTER (OUTPATIENT)
Dept: RADIATION ONCOLOGY | Facility: HOSPITAL | Age: 58
Discharge: HOME | End: 2023-01-25

## 2023-01-25 PROCEDURE — 77385: CPT | Performed by: RADIOLOGY

## 2023-01-27 ENCOUNTER — HOSPITAL ENCOUNTER (OUTPATIENT)
Dept: RADIATION ONCOLOGY | Facility: HOSPITAL | Age: 58
Discharge: HOME | End: 2023-01-27

## 2023-01-27 PROCEDURE — 77385: CPT | Performed by: RADIOLOGY

## 2023-01-30 NOTE — RADIATION COMPLETION NOTES
COMPLETION NOTE    PATIENT:   Ubaldo Thomas  :    1965  COMPLETION DATE:   2023   DIAGNOSIS:   Pathologic T1b N0 M0 stage I invasive ductal carcinoma of the left breast upper inner quadrant        Subjective      BRIEF HISTORY:  Ubaldo Thomas  is a very pleasant 57 y.o. female diagnosed with a moderately differentiated invasive ductal carcinoma the left breast measuring 6 mm.  Initial margins were positive for DCIS and final margins were negative.  0/2 sentinel nodes were positive.  Tumor was ER/KY positive HER2/leonor negative.   She is interested in accelerated partial breast irradiation.  Final margins were negative by at least 4 mm.She received radiotherapy in our department as follows:    TREATMENT COURSE:   -2023 the left partial breast received 30 Gray in 5 fractions with 6 MV photons      TOLERANCE:   Ms. Thomas had no difficulty with the treatment themselves but she did develop COVID 19 and was treated at the end of each day.  The skin of the treatment area was nonerythematous and she had no edema.    DISPOSITION:  At the completion of therapy an appointment was made for her to return on 2023 at 1:00 PM.  She knows to call if she has any problems sooner.        Claudia Mccrary MD    Dictated using dragon dictation

## 2023-02-20 NOTE — PROGRESS NOTES
NAME: Ubaldo Thomas    : 1965    DATE: 2023    DIAGNOSIS:   1.  Stage IA (pT1bN0(sn)MX) moderately differentiated invasive ductal carcinoma of the L breast.  Tumor was 6 mm in maximal dimension with associated DCIS.  Initial margin positive for DCIS with revision margins now clear.  0/2 SLN involved.  Tumor was 88% 3+ ER+, 86 % 3+ CO+ and HER-2/Frances Negative (1+) with negative FISH testing.  Mammaprint Low risk Luminal-Type A.    2.  Osteoporosis Baseline DEXA 22 with T score -3.1      TREATMENT HISTORY:    1.  L Lumpectomy, SLNBx (Dr. Jackson) 10-17-22  2.  Revision for clearance of margins 10-26-22  3.  Dr. Mccrary delivered accelerated partial breast irradiation between23 and 23  4.  Will start Tamoxifen daily 23      CHIEF COMPLAINT:  Follow-up of breast cancer      HISTORY OF PRESENT ILLNESS:   Ubaldo Thomas is a very pleasant 57 y.o. female who was referred by Dr. Elva Jackson for evaluation and treatment of breast cancer.  She was in her usual state of health when she underwent screening mammogram as below which was abnormal.  She had a biopsy on 2022 revealing an invasive mammary carcinoma, grade 2 with associated DCIS.  Tumor was ER/CO positive and HER2/frances negative.  She underwent genetic testing through [facility] which showed no genetic mutations.  On 10/17/2022 Dr. Jackson took her for left lumpectomy and sentinel lymph node biopsy.  Small tumor was resected as below and margins were clear for invasive ductal carcinoma, but she did have involvement of the anterior margin by DCIS.  She underwent revision on 10/26/2022 with clearance of margins.  She is referred here for discussion of further therapy.    Ms. Thomas is overall doing well.  She denies weight loss, chest pain, shortness of breath, abdominal pain, nausea, vomiting, diarrhea or constipation.  She denies rectal bleeding.  She denies bone pain, headaches or visual change.      INTERVAL  HISTORY:   Ms. Thomas is here today with her daughter for fu of breast cancer.  Since her last visit, she saw Dr. Mccrary and completed accelerated partial breast irradiation.  She tolerated her treatment very well.  She did unfortunately develop COVID during her radiation.  She took Paxlovid which helped but she then developed some rebound symptoms.  She is now well recovered.  She is here today for follow-up and to discuss hormonal therapy.  She also plans to start Prolia for osteoporosis today.        PAST MEDICAL HISTORY:  Past Medical History:   Diagnosis Date   • Bell's palsy    • Breast cancer (HCC)    • Kidney stones    • Skin cancer     Melanoma InSitu       Risk Factors:  Age of Menarche: 13 years old  Age of Menopause: 40s  Prior Breast Disease: She has history of fibrocystic breast disease but no history of abnormal mammograms or biopsies  Pregnancies:    Age of 1st pregnancy: 29 years old  Family History of Breast Cancer: None  Family History of Ovarian Cancer: Maternal grandmother had some type of malignancy in her 60s which was possibly ovarian cancer although this is uncertain  History of HRT use: None        PAST SURGICAL HISTORY:  Past Surgical History:   Procedure Laterality Date   • BREAST LUMPECTOMY Left 10/17/2022   • KIDNEY STONE SURGERY     - History of removal of melanoma in situ involving right posterior thigh  - 2022 underwent surgery for left facial nerve reanimation for treatment of Bell's palsy.    FAMILY HISTORY:  Family History   Problem Relation Age of Onset   • Diabetes Mother    • Heart disease Father        SOCIAL HISTORY:  Social History     Socioeconomic History   • Marital status:    Tobacco Use   • Smoking status: Never   • Smokeless tobacco: Never   Vaping Use   • Vaping Use: Never used   Substance and Sexual Activity   • Alcohol use: Yes     Comment: occasionally   • Drug use: Never   • Sexual activity: Defer         REVIEW OF SYSTEMS:   A comprehensive 14  point review of systems was performed.  Significant findings as mentioned above.  All other systems reviewed and are negative.      MEDICATIONS:  The current medication list was reviewed in the EMR    Current Outpatient Medications:   •  vitamin D (ERGOCALCIFEROL) 1.25 MG (01750 UT) capsule capsule, Take 1 capsule by mouth 1 (One) Time Per Week., Disp: 4 capsule, Rfl: 5  •  tamoxifen (NOLVADEX) 20 MG chemo tablet, Take 1 tablet by mouth Daily., Disp: 30 tablet, Rfl: 6    ALLERGIES:    Allergies   Allergen Reactions   • Dexamethasone Arrhythmia       PHYSICAL EXAM:  Vitals:    02/21/23 1239   BP: 123/79   Pulse: 87   Resp: 18   Temp: 97.3 °F (36.3 °C)   TempSrc: Temporal   SpO2: 98%   Weight: 67.1 kg (148 lb)   PainSc: 0-No pain     Body surface area is 1.7 meters squared.   Body mass index is 26.22 kg/m².      ECOG score: 0     General:  Awake, alert and oriented, appears well.  HEENT:  Pupils are equal, round and reactive to light and accommodation, Extra-ocular movements full, Oropharyx clear, mucous membranes mois.  She has asymmetry of the face consistent with left sided Bell's palsy.  Neck:  No JVD, thyromegaly or lymphadenopathy  CV:  Regular rate and rhythm, no murmurs, rubs or gallops  Resp:  Lungs are clear to auscultation bilaterally, no wheezing  Breast: Status post left lumpectomy and revision as well as left sentinel lymph node biopsy.  She has some firmer tissue around the sites of her surgeries as well as what appears to be an old biopsy site.  All is healing well.  No mass lesions.  No left axillary adenopathy.  Right breast without mass lesions.  Right axilla without adenopathy.  Abd:  Soft, non-tender, non-distended, bowel sounds present, no organomegaly or masses  Ext:  No clubbing, cyanosis, no lower extremity edema  Lymph:  No cervical, supraclavicular, axillary, inguinal or femoral adenopathy  Neuro:  MS as above, CN II-XII intact, grossly non-focal  exam      PATHOLOGY:    09/28/2022            FISH Analysis 09/28/2022      10/17/2022      10/26/2022      Genetic Testing 11/07/2022    Mammaprint 11/28/22          ENDOSCOPY:    Colonoscopy 10/16/17        IMAGING:    Bilateral Diagnostic Mammogram with Bilateral Breast Ultrasound ([facility]) 09/27/2022        Bilateral Breast MRI ([facility]) 09/30/2022      Mammoprint Results 11/28/22      DEXA Bone Density Axial (12/08/2022 14:11)  FINDINGS: The BMD measured at the AP spine L1-L4 is 0.808 gm/cm2 with a  T-score of -3.1.      IMPRESSION:  The patient is considered to be osteoporotic according to the  World Health Organization criteria. Fracture risk is high.     RECENT LABS:  Lab Results   Component Value Date    WBC 5.60 02/21/2023    HGB 13.6 02/21/2023    HCT 41.1 02/21/2023    MCV 91.7 02/21/2023    RDW 13.1 02/21/2023     02/21/2023    NEUTRORELPCT 50.5 02/21/2023    LYMPHORELPCT 33.2 02/21/2023    MONORELPCT 12.3 (H) 02/21/2023    EOSRELPCT 2.7 02/21/2023    BASORELPCT 0.9 02/21/2023    NEUTROABS 2.83 02/21/2023    LYMPHSABS 1.86 02/21/2023       Lab Results   Component Value Date     02/21/2023    K 4.4 02/21/2023    CO2 25.8 02/21/2023     02/21/2023    BUN 12 02/21/2023    CREATININE 0.73 02/21/2023    GLUCOSE 89 02/21/2023    CALCIUM 9.8 02/21/2023    ALKPHOS 105 02/21/2023    AST 22 02/21/2023    ALT 18 02/21/2023    BILITOT 0.6 02/21/2023    ALBUMIN 4.2 02/21/2023    PROTEINTOT 6.8 02/21/2023    MG 2.3 02/21/2023    PHOS 4.4 02/21/2023     Lab Results   Component Value Date    FERRITIN 129.40 11/15/2022    IRON 57 11/15/2022    TIBC 350 11/15/2022    LABIRON 16 (L) 11/15/2022    PGFUUSHL12 594 11/15/2022    FOLATE 9.82 11/15/2022     Lab Results   Component Value Date    TSH 2.430 11/15/2022    LWPZ54GB 28.8 (L) 11/15/2022     Lab Results   Component Value Date     26.2 (H) 11/15/2022     Lab Results   Component Value Date    LABCA2 31.1 11/15/2022           ASSESSMENT & PLAN:  Ubaldo  Killian Thomas is a very pleasant 57 y.o. female with recently diagnosed Stage IA (pT1bN0(sn)MX) moderately differentiated invasive ductal carcinoma of the L breast.  Tumor was 6 mm in maximal dimension with associated DCIS.  Initial margin positive for DCIS with revision margins now clear.  0/2 SLN involved.  Tumor was 88% 3+ ER+, 86 % 3+ IL+ and HER-2/Frances Negative (1+) with negative FISH testing.  Mammaprint Low Risk Luminal-Type A.    1.  Left Breast Cancer:  -  S/p L Lumpectomy 10-17-22 with SLNBx with revision of margin on 10-26-22 for involvement with DCIS.  Surgical margins clear.    -  Overall her tumor has favorable characteristics including strong hormonal positivity.  -  Given tumor size just >5 mm, I have recommended we send genomic profiling to determine whether risk is sufficient to necessitate treatment with chemotherapy. Happily, testing returned as low risk so chemotherapy was not recommended.  - Return to Dr. Claudia Mccrary for accelerated partial breast irradiation which she completed on 1/27/2023.  She did very well with her treatment.  - She now requires hormonal therapy given strongly hormonally driven cancer.  She is postmenopausal and has an intact uterus.  She has pre-existing osteoporosis with a T score of -3.1 at baseline.  We discussed the reason for hormonal therapy, and different options including treatment with an aromatase inhibitor versus tamoxifen.  We discussed the relative risks benefits and side effects of each, and after conversation, she would like to proceed with tamoxifen therapy.  She does understand that this carries with it a small but increased risk of development of endometrial cancer, knows she will need to see her gynecologist once a year.  She says that she does this already and will continue to follow-up with her gynecologist.  -We will have her return in 6 to 8 weeks for survivorship visit and to see how she is doing on the tamoxifen.  - We will plan to obtain  bilateral diagnostic mammogram in 3 months (mid May).    2.  Osteoporosis:  -Baseline DEXA scan performed 12/8/2022 showed osteoporosis with a T score of -3.1.  I have recommended treatment with Prolia.  She saw her dentist and plans to begin treatment today.  She will start calcium with vitamin D twice daily.  - She continues on high-dose vitamin D replacement.  We will repeat level at her next visit.    3. Prophylaxis:  -  Ubaldo Thomas received 2022 flu vaccine.  -  Ubaldo Thomas has not had Prevnar vaccine.  -  Ubaldo Thomas received 4 doses COVID vaccine. and received bivalent booster.  -  Ubaldo Thomas has had colonoscopy x 2 in the past (doesn't remember when and doesn't remember the result but does not believe she has had polyps removed).  She says her PCP, Dr. Angelo Kingston would have a record of c-scopes which she says were performed by Dr. Archibald.  Will again see if we can get a copy of records.  She says she will see if she can find a copy as well.    4.  ACO / KIARA/Other  Quality measures  -  Ubaldo Thomas received 2022 flu vaccine.  -  Ubaldo Thomas reports a pain score of 0.  Given her pain assessment as noted, treatment options were discussed and the following options were decided upon as a follow-up plan to address the patient's pain: No intervention needed at this time.  -  Current outpatient and discharge medications have been reconciled for the patient.  Reviewed by: Nidhi oPrras MD      4.  F/u:  -Start tamoxifen  - Start Prolia  - Yearly follow-up with gynecology  - Start calcium/vitamin D twice daily  - Continue vitamin D replacement which I refilled today.  - Bilateral diagnostic mammogram after 3 months (mid May)  - Return to see me in 6 to 8 weeks for survivorship visit with CBC, CMP, TSH and vitamin D.  -  Attempt to get copy of C-scope records.       I spent 30 minutes with Ubaldo Thomas today.  In the office today, more than 50% of  this time was spent face-to-face with her  in counseling / coordination of care, reviewing her medical history and counseling on the current treatment plan.  All questions were answered to her satisfaction      Electronically Signed by: Nidhi Porras MD

## 2023-02-21 ENCOUNTER — LAB (OUTPATIENT)
Dept: ONCOLOGY | Facility: CLINIC | Age: 58
End: 2023-02-21
Payer: COMMERCIAL

## 2023-02-21 ENCOUNTER — OFFICE VISIT (OUTPATIENT)
Dept: ONCOLOGY | Facility: CLINIC | Age: 58
End: 2023-02-21
Payer: COMMERCIAL

## 2023-02-21 ENCOUNTER — INFUSION (OUTPATIENT)
Dept: ONCOLOGY | Facility: HOSPITAL | Age: 58
End: 2023-02-21
Payer: COMMERCIAL

## 2023-02-21 VITALS
SYSTOLIC BLOOD PRESSURE: 127 MMHG | TEMPERATURE: 97.5 F | OXYGEN SATURATION: 98 % | RESPIRATION RATE: 18 BRPM | HEART RATE: 86 BPM | DIASTOLIC BLOOD PRESSURE: 84 MMHG

## 2023-02-21 VITALS
BODY MASS INDEX: 26.22 KG/M2 | SYSTOLIC BLOOD PRESSURE: 123 MMHG | OXYGEN SATURATION: 98 % | HEART RATE: 87 BPM | WEIGHT: 148 LBS | TEMPERATURE: 97.3 F | RESPIRATION RATE: 18 BRPM | DIASTOLIC BLOOD PRESSURE: 79 MMHG

## 2023-02-21 DIAGNOSIS — Z78.0 POSTMENOPAUSAL: Primary | ICD-10-CM

## 2023-02-21 DIAGNOSIS — R53.83 FATIGUE, UNSPECIFIED TYPE: Primary | ICD-10-CM

## 2023-02-21 DIAGNOSIS — Z78.0 POSTMENOPAUSAL: ICD-10-CM

## 2023-02-21 DIAGNOSIS — Z17.0 MALIGNANT NEOPLASM OF UPPER-INNER QUADRANT OF LEFT BREAST IN FEMALE, ESTROGEN RECEPTOR POSITIVE: ICD-10-CM

## 2023-02-21 DIAGNOSIS — E55.9 VITAMIN D DEFICIENCY: ICD-10-CM

## 2023-02-21 DIAGNOSIS — M81.0 AGE-RELATED OSTEOPOROSIS WITHOUT CURRENT PATHOLOGICAL FRACTURE: ICD-10-CM

## 2023-02-21 DIAGNOSIS — Z17.0 MALIGNANT NEOPLASM OF LEFT BREAST IN FEMALE, ESTROGEN RECEPTOR POSITIVE, UNSPECIFIED SITE OF BREAST: ICD-10-CM

## 2023-02-21 DIAGNOSIS — C50.212 MALIGNANT NEOPLASM OF UPPER-INNER QUADRANT OF LEFT BREAST IN FEMALE, ESTROGEN RECEPTOR POSITIVE: ICD-10-CM

## 2023-02-21 DIAGNOSIS — M81.0 AGE-RELATED OSTEOPOROSIS WITHOUT CURRENT PATHOLOGICAL FRACTURE: Primary | ICD-10-CM

## 2023-02-21 DIAGNOSIS — C50.912 MALIGNANT NEOPLASM OF LEFT BREAST IN FEMALE, ESTROGEN RECEPTOR POSITIVE, UNSPECIFIED SITE OF BREAST: ICD-10-CM

## 2023-02-21 LAB
ALBUMIN SERPL-MCNC: 4.2 G/DL (ref 3.5–5.2)
ALBUMIN/GLOB SERPL: 1.6 G/DL
ALP SERPL-CCNC: 105 U/L (ref 39–117)
ALT SERPL W P-5'-P-CCNC: 18 U/L (ref 1–33)
ANION GAP SERPL CALCULATED.3IONS-SCNC: 10.2 MMOL/L (ref 5–15)
AST SERPL-CCNC: 22 U/L (ref 1–32)
BASOPHILS # BLD AUTO: 0.05 10*3/MM3 (ref 0–0.2)
BASOPHILS NFR BLD AUTO: 0.9 % (ref 0–1.5)
BILIRUB SERPL-MCNC: 0.6 MG/DL (ref 0–1.2)
BUN SERPL-MCNC: 12 MG/DL (ref 6–20)
BUN/CREAT SERPL: 16.4 (ref 7–25)
CALCIUM SPEC-SCNC: 9.8 MG/DL (ref 8.6–10.5)
CHLORIDE SERPL-SCNC: 106 MMOL/L (ref 98–107)
CO2 SERPL-SCNC: 25.8 MMOL/L (ref 22–29)
CREAT SERPL-MCNC: 0.73 MG/DL (ref 0.57–1)
DEPRECATED RDW RBC AUTO: 43.8 FL (ref 37–54)
EGFRCR SERPLBLD CKD-EPI 2021: 95.5 ML/MIN/1.73
EOSINOPHIL # BLD AUTO: 0.15 10*3/MM3 (ref 0–0.4)
EOSINOPHIL NFR BLD AUTO: 2.7 % (ref 0.3–6.2)
ERYTHROCYTE [DISTWIDTH] IN BLOOD BY AUTOMATED COUNT: 13.1 % (ref 12.3–15.4)
GLOBULIN UR ELPH-MCNC: 2.6 GM/DL
GLUCOSE SERPL-MCNC: 89 MG/DL (ref 65–99)
HCT VFR BLD AUTO: 41.1 % (ref 34–46.6)
HGB BLD-MCNC: 13.6 G/DL (ref 12–15.9)
IMM GRANULOCYTES # BLD AUTO: 0.02 10*3/MM3 (ref 0–0.05)
IMM GRANULOCYTES NFR BLD AUTO: 0.4 % (ref 0–0.5)
LYMPHOCYTES # BLD AUTO: 1.86 10*3/MM3 (ref 0.7–3.1)
LYMPHOCYTES NFR BLD AUTO: 33.2 % (ref 19.6–45.3)
MAGNESIUM SERPL-MCNC: 2.3 MG/DL (ref 1.6–2.6)
MCH RBC QN AUTO: 30.4 PG (ref 26.6–33)
MCHC RBC AUTO-ENTMCNC: 33.1 G/DL (ref 31.5–35.7)
MCV RBC AUTO: 91.7 FL (ref 79–97)
MONOCYTES # BLD AUTO: 0.69 10*3/MM3 (ref 0.1–0.9)
MONOCYTES NFR BLD AUTO: 12.3 % (ref 5–12)
NEUTROPHILS NFR BLD AUTO: 2.83 10*3/MM3 (ref 1.7–7)
NEUTROPHILS NFR BLD AUTO: 50.5 % (ref 42.7–76)
NRBC BLD AUTO-RTO: 0 /100 WBC (ref 0–0.2)
PHOSPHATE SERPL-MCNC: 4.4 MG/DL (ref 2.5–4.5)
PLATELET # BLD AUTO: 253 10*3/MM3 (ref 140–450)
PMV BLD AUTO: 9.6 FL (ref 6–12)
POTASSIUM SERPL-SCNC: 4.4 MMOL/L (ref 3.5–5.2)
PROT SERPL-MCNC: 6.8 G/DL (ref 6–8.5)
RBC # BLD AUTO: 4.48 10*6/MM3 (ref 3.77–5.28)
SODIUM SERPL-SCNC: 142 MMOL/L (ref 136–145)
WBC NRBC COR # BLD: 5.6 10*3/MM3 (ref 3.4–10.8)

## 2023-02-21 PROCEDURE — 85025 COMPLETE CBC W/AUTO DIFF WBC: CPT | Performed by: INTERNAL MEDICINE

## 2023-02-21 PROCEDURE — 80053 COMPREHEN METABOLIC PANEL: CPT | Performed by: INTERNAL MEDICINE

## 2023-02-21 PROCEDURE — 96372 THER/PROPH/DIAG INJ SC/IM: CPT

## 2023-02-21 PROCEDURE — 99214 OFFICE O/P EST MOD 30 MIN: CPT | Performed by: INTERNAL MEDICINE

## 2023-02-21 PROCEDURE — 25010000002 DENOSUMAB 60 MG/ML SOLUTION PREFILLED SYRINGE: Performed by: INTERNAL MEDICINE

## 2023-02-21 PROCEDURE — 83735 ASSAY OF MAGNESIUM: CPT | Performed by: INTERNAL MEDICINE

## 2023-02-21 PROCEDURE — 84100 ASSAY OF PHOSPHORUS: CPT | Performed by: INTERNAL MEDICINE

## 2023-02-21 RX ORDER — ERGOCALCIFEROL 1.25 MG/1
50000 CAPSULE ORAL WEEKLY
Qty: 4 CAPSULE | Refills: 5 | Status: SHIPPED | OUTPATIENT
Start: 2023-02-21

## 2023-02-21 RX ORDER — TAMOXIFEN CITRATE 20 MG/1
20 TABLET ORAL DAILY
Qty: 30 TABLET | Refills: 6 | Status: SHIPPED | OUTPATIENT
Start: 2023-02-21

## 2023-02-21 RX ADMIN — DENOSUMAB 60 MG: 60 INJECTION SUBCUTANEOUS at 14:38

## 2023-02-21 NOTE — PROGRESS NOTES
Venipuncture Blood Specimen Collection  Venipuncture performed in left arm by Dania Ma MA with good hemostasis. Patient tolerated the procedure well without complications.   02/21/23   Dania Ma MA

## 2023-02-28 ENCOUNTER — HOSPITAL ENCOUNTER (OUTPATIENT)
Dept: RADIATION ONCOLOGY | Facility: HOSPITAL | Age: 58
Setting detail: RADIATION/ONCOLOGY SERIES
Discharge: HOME | End: 2023-02-28
Payer: COMMERCIAL

## 2023-02-28 ENCOUNTER — OFFICE VISIT (OUTPATIENT)
Dept: RADIATION ONCOLOGY | Facility: HOSPITAL | Age: 58
End: 2023-02-28
Payer: COMMERCIAL

## 2023-02-28 VITALS
OXYGEN SATURATION: 95 % | DIASTOLIC BLOOD PRESSURE: 81 MMHG | TEMPERATURE: 97.5 F | WEIGHT: 145.9 LBS | RESPIRATION RATE: 16 BRPM | HEART RATE: 87 BPM | SYSTOLIC BLOOD PRESSURE: 119 MMHG | BODY MASS INDEX: 25.85 KG/M2

## 2023-02-28 DIAGNOSIS — Z17.0 MALIGNANT NEOPLASM OF UPPER-INNER QUADRANT OF LEFT BREAST IN FEMALE, ESTROGEN RECEPTOR POSITIVE: Primary | ICD-10-CM

## 2023-02-28 DIAGNOSIS — C50.212 MALIGNANT NEOPLASM OF UPPER-INNER QUADRANT OF LEFT BREAST IN FEMALE, ESTROGEN RECEPTOR POSITIVE: Primary | ICD-10-CM

## 2023-02-28 NOTE — PROGRESS NOTES
FOLLOW UP NOTE    PATIENT:                                                      Ubaldo Thomas  MEDICAL RECORD #:                        [id number]  :                                                          1965  COMPLETION DATE:   2023  DIAGNOSIS:     Invasive ductal carcinoma of the left breast upper inner quadrant  -Stage IA (pT1bN0(sn)M0)      BRIEF HISTORY:  Ubaldo Thomas is a very pleasant 57 y.o. female presenting today for initial follow-up of her left-sided breast cancer.  She was found to have abnormality on screening mammogram.  Biopsy performed 2022 revealed invasive mammary carcinoma, grade 2 with associated DCIS.  Tumor was ER/LA positive, HER2/leonor negative.  Genetics testing was negative.  She was taken for left breast lumpectomy 10/17/2022.  Final pathology revealed 6 mm moderately differentiated invasive ductal carcinoma.  Initial margins were positive for DCIS.  She was taken for reexcision with final margins negative by at least 4 mm.  0/2 sentinel lymph nodes were positive.  She underwent adjuvant accelerated partial breast irradiation to a dose of 30 Gray in 5 fractions, completing 2023.  She developed fatigue during treatment, which also coincided with a COVID-19 infection treated with Paxlovid.  At this point, she reports energy level is improving.  She developed some mild hyperpigmentation of the skin of the left breast, without dry or moist desquamation.  She reports occasional postoperative pain within the left axilla.  She denies left breast or chest pain.  She denies dyspnea.  She denies lymphedema.  She has begun adjuvant tamoxifen, first dose started today.  She denies new or progressive bone pains.  Overall, she feels well.        MEDICATIONS: Medication reconciliation for the patient was reviewed and confirmed in the electronic medical record.    Review of Systems   Constitutional: Positive for fatigue.   All other systems reviewed and are  negative.      KPS 90%          Physical Exam  Vitals and nursing note reviewed.   Constitutional:       General: She is not in acute distress.     Appearance: She is well-developed.   HENT:      Head: Normocephalic and atraumatic.   Eyes:      Conjunctiva/sclera: Conjunctivae normal.      Pupils: Pupils are equal, round, and reactive to light.   Cardiovascular:      Rate and Rhythm: Normal rate and regular rhythm.   Pulmonary:      Effort: Pulmonary effort is normal.      Breath sounds: Normal breath sounds.   Chest:      Comments: The breast exam reveals well-healed surgical incisions of the left breast and left axilla.  There is some associated fibrosis involving the surgical incision at the 12 o'clock position and surgical incision adjacent to the nipple areolar complex.  Skin appears nonerythematous and intact.  There are no suspicious masses or nodules on the left.  No evidence of lymphedema or axillary adenopathy on the left.  The right breast was not examined.  Musculoskeletal:         General: Normal range of motion.   Skin:     General: Skin is warm and dry.   Neurological:      Mental Status: She is alert and oriented to person, place, and time.   Psychiatric:         Behavior: Behavior normal.         Thought Content: Thought content normal.         Judgment: Judgment normal.         VITAL SIGNS:   Vitals:    02/28/23 1553   BP: 119/81   Pulse: 87   Resp: 16   Temp: 97.5 °F (36.4 °C)   TempSrc: Temporal   SpO2: 95%   Weight: 66.2 kg (145 lb 14.4 oz)   PainSc: 0-No pain             The following portions of the patient's history were reviewed and updated as appropriate: allergies, current medications, past family history, past medical history, past social history, past surgical history and problem list.         Diagnoses and all orders for this visit:    1. Malignant neoplasm of upper-inner quadrant of left breast in female, estrogen receptor positive (HCC) (Primary)         IMPRESSION:  Ubaldo Thomas is  a 57 y.o. female with recent diagnosis of a stage IA (pT1bN0(sn)M0) ER/DE positive, HER-2/leonor negative IDC of the left breast.  Following left breast lumpectomy and reexcision to achieve negative margins, she underwent adjuvant left partial breast irradiation, which she completed 1 month ago as part of her breast conservation treatment.  She tolerated treatment well.  She developed the anticipated grade 1 radiation dermatitis and grade 1 fatigue, and no significant acute radiation-related toxicities otherwise.  Clinical exam of the left breast today is benign.  We discussed the role of local breast massage, as well as stretching and range of motion exercises of the left upper extremity to minimize the risk of treatment related fibrosis or lymphedema.  The patient has begun adjuvant hormonal therapy with tamoxifen.  She is scheduled for repeat diagnostic mammogram of the bilateral breasts on 5/25/2023.  We discussed follow-up intervals, including biannual mammograms to alternate between the left and bilateral breasts, biannual clinical breast exams, and monthly self breast exams.    RECOMMENDATIONS:  Ubaldo Thomas continues routine oncologic surveillance under the care of Dr. Porras.  At this point, radiation oncology will sign off but remain available as needed.    Return if symptoms worsen or fail to improve, for Office Visit.    CHELE Sanchez spent a total of 30 minutes on today's visit, with more than 15 minutes in direct face to face communication, and the remainder of the time spent in reviewing the relevant history, records, available imaging, and for documentation.

## 2023-04-17 NOTE — PROGRESS NOTES
NAME: Ubaldo Thomas    : 1965    DATE: 2023    DIAGNOSIS:   1.  Stage IA (pT1bN0(sn)MX) moderately differentiated invasive ductal carcinoma of the L breast.  Tumor was 6 mm in maximal dimension with associated DCIS.  Initial margin positive for DCIS with revision margins now clear.  0/2 SLN involved.  Tumor was 88% 3+ ER+, 86 % 3+ ID+ and HER-2/Frances Negative (1+) with negative FISH testing.  Mammaprint Low risk Luminal-Type A.    2.  Osteoporosis Baseline DEXA 22 with T score -3.1      TREATMENT HISTORY:    1.  L Lumpectomy, SLNBx (Dr. Jackson) 10-17-22  2.  Revision for clearance of margins 10-26-22  3.  Dr. Mccrary delivered accelerated partial breast irradiation between23 and 23  4. Started Tamoxifen daily 23      CHIEF COMPLAINT:  Follow-up of breast cancer and Survivorship visit      HISTORY OF PRESENT ILLNESS:   Ubaldo Thomas is a very pleasant 57 y.o. female who was referred by Dr. Elva Jackson for evaluation and treatment of breast cancer.  She was in her usual state of health when she underwent screening mammogram as below which was abnormal.  She had a biopsy on 2022 revealing an invasive mammary carcinoma, grade 2 with associated DCIS.  Tumor was ER/ID positive and HER2/frances negative.  She underwent genetic testing through [facility] which showed no genetic mutations.  On 10/17/2022 Dr. Jackson took her for left lumpectomy and sentinel lymph node biopsy.  Small tumor was resected as below and margins were clear for invasive ductal carcinoma, but she did have involvement of the anterior margin by DCIS.  She underwent revision on 10/26/2022 with clearance of margins.  She is referred here for discussion of further therapy.    Ms. Thomas is overall doing well.  She denies weight loss, chest pain, shortness of breath, abdominal pain, nausea, vomiting, diarrhea or constipation.  She denies rectal bleeding.  She denies bone pain, headaches or visual  change.      INTERVAL HISTORY:   Ms. Thomas is here today for f/u of breast cancer and Survivorship visit.  She continues to take Tamoxifen and is overall tolerating this well. She has noticed increased vaginal discharge since starting this, but no vaginal bleeding.  She has been struggling recently with anxiety, depression and chest discomfort which she says might be related to anxiety or might not.  She saw her cardiologist who is doing testing.  She says that it seems like everything that happened with the cancer is kind of catching up to her now.  She says over a short period of time she went from being really healthy to having breast cancer, osteoporosis, [place]wood Palsy and kidney stones.  Additionally she lost her mother, lost her foster child of 5 years and lost 2 dogs.  It has been a hard period in her life.  She says she feels guilty because she knows she has had it easy compared with many patients.  Bowen is tolerating Prolia without difficulty.  She has been taking Ca/Vit D but stopped the high dose vitamin D because she thought the Ca/Vit D was supposed to replace it.  She has an appt to be seen at a center that specializes in [place] Palsy to see if they can help her to regain strength in her facial muscles.         PAST MEDICAL HISTORY:  Past Medical History:   Diagnosis Date   • Bell's palsy    • Breast cancer    • History of radiation therapy 2023    Left breast APBI   • Kidney stones    • Skin cancer     Melanoma InSitu       Risk Factors:  Age of Menarche: 13 years old  Age of Menopause: 40s  Prior Breast Disease: She has history of fibrocystic breast disease but no history of abnormal mammograms or biopsies  Pregnancies:    Age of 1st pregnancy: 29 years old  Family History of Breast Cancer: None  Family History of Ovarian Cancer: Maternal grandmother had some type of malignancy in her 60s which was possibly ovarian cancer although this is uncertain  History of HRT use: None        PAST  "SURGICAL HISTORY:  Past Surgical History:   Procedure Laterality Date   • BREAST LUMPECTOMY Left 10/17/2022   • KIDNEY STONE SURGERY     - History of removal of melanoma in situ involving right posterior thigh  - 8/14/2022 underwent surgery for left facial nerve reanimation for treatment of Bell's palsy.    FAMILY HISTORY:  Family History   Problem Relation Age of Onset   • Diabetes Mother    • Heart disease Father        SOCIAL HISTORY:  Social History     Socioeconomic History   • Marital status:    Tobacco Use   • Smoking status: Never   • Smokeless tobacco: Never   Vaping Use   • Vaping Use: Never used   Substance and Sexual Activity   • Alcohol use: Yes     Comment: occasionally   • Drug use: Never   • Sexual activity: Defer         REVIEW OF SYSTEMS:   A comprehensive 14 point review of systems was performed.  Significant findings as mentioned above.  All other systems reviewed and are negative.      MEDICATIONS:  The current medication list was reviewed in the EMR    Current Outpatient Medications:   •  Calcium Carbonate-Vitamin D 600-10 MG-MCG per tablet, Take 1 tablet by mouth 2 (Two) Times a Day., Disp: 60 tablet, Rfl: 5  •  tamoxifen (NOLVADEX) 20 MG chemo tablet, Take 1 tablet by mouth Daily., Disp: 30 tablet, Rfl: 6  •  vitamin D (ERGOCALCIFEROL) 1.25 MG (14107 UT) capsule capsule, Take 1 capsule by mouth 1 (One) Time Per Week., Disp: 4 capsule, Rfl: 5    ALLERGIES:    Allergies   Allergen Reactions   • Dexamethasone Arrhythmia       PHYSICAL EXAM:  Vitals:    04/18/23 1354   BP: 119/74   Pulse: 105   Resp: 18   Temp: 97.5 °F (36.4 °C)   SpO2: 98%   Weight: 66.7 kg (147 lb)   Height: 160 cm (63\")   PainSc: 0-No pain     Body surface area is 1.7 meters squared.   Body mass index is 26.04 kg/m².      ECOG score: 0     General:  Awake, alert and oriented, tearful today, but non-toxic appearing  HEENT:  Pupils are equal, round and reactive to light and accommodation, Extra-ocular movements full, " Oropharyx clear, mucous membranes moist.  She has asymmetry of the face consistent with left sided Bell's palsy.  Neck:  No JVD, thyromegaly or lymphadenopathy  CV:  Regular rate and rhythm, no murmurs, rubs or gallops  Resp:  Lungs are clear to auscultation bilaterally, no crackles  Breast: Status post left lumpectomy and revision as well as left sentinel lymph node biopsy.  She has some firmer tissue around the sites of her surgeries as well as what appears to be an old biopsy site.  No palpable mass lesions.  No left axillary adenopathy.  Right breast without mass lesions.  Right axilla without adenopathy.  Abd:  Soft, non-tender, non-distended, bowel sounds present, no organomegaly or masses  Ext:  No clubbing, cyanosis, or LE edema  Lymph:  No cervical, supraclavicular, axillary, inguinal or femoral adenopathy  Neuro:  MS as above, CN II-XII intact, grossly non-focal exam      PATHOLOGY:    09/28/2022            FISH Analysis 09/28/2022      10/17/2022      10/26/2022      Genetic Testing 11/07/2022    Mammaprint 11/28/22          ENDOSCOPY:    Colonoscopy 10/16/17          IMAGING:    Bilateral Diagnostic Mammogram with Bilateral Breast Ultrasound ([facility]) 09/27/2022        Bilateral Breast MRI ([facility]) 09/30/2022      Mammoprint Results 11/28/22      DEXA Bone Density Axial (12/08/2022 14:11)  FINDINGS: The BMD measured at the AP spine L1-L4 is 0.808 gm/cm2 with a  T-score of -3.1.      IMPRESSION:  The patient is considered to be osteoporotic according to the  World Health Organization criteria. Fracture risk is high.     RECENT LABS:  Lab Results   Component Value Date    WBC 8.13 04/18/2023    HGB 14.0 04/18/2023    HCT 42.8 04/18/2023    MCV 93.7 04/18/2023    RDW 13.2 04/18/2023     04/18/2023    NEUTRORELPCT 64.3 04/18/2023    LYMPHORELPCT 27.8 04/18/2023    MONORELPCT 5.8 04/18/2023    EOSRELPCT 1.0 04/18/2023    BASORELPCT 0.9 04/18/2023    NEUTROABS 5.23 04/18/2023    LYMPHSABS 2.26 04/18/2023        Lab Results   Component Value Date     04/18/2023    K 3.6 04/18/2023    CO2 24.3 04/18/2023     04/18/2023    BUN 17 04/18/2023    CREATININE 0.76 04/18/2023    GLUCOSE 144 (H) 04/18/2023    CALCIUM 8.9 04/18/2023    ALKPHOS 61 04/18/2023    AST 20 04/18/2023    ALT 12 04/18/2023    BILITOT 0.7 04/18/2023    ALBUMIN 4.3 04/18/2023    PROTEINTOT 7.2 04/18/2023    MG 2.3 02/21/2023    PHOS 4.4 02/21/2023     Lab Results   Component Value Date    FERRITIN 129.40 11/15/2022    IRON 57 11/15/2022    TIBC 350 11/15/2022    LABIRON 16 (L) 11/15/2022    [id number] 594 11/15/2022    FOLATE 9.82 11/15/2022     Lab Results   Component Value Date    TSH 2.680 04/18/2023    FHIN53FF 28.8 (L) 11/15/2022     Lab Results   Component Value Date     26.2 (H) 11/15/2022     Lab Results   Component Value Date    LABCA2 31.1 11/15/2022           ASSESSMENT & PLAN:  Ubaldo Thomas is a very pleasant 57 y.o. female with recently diagnosed Stage IA (pT1bN0(sn)MX) moderately differentiated invasive ductal carcinoma of the L breast.  Tumor was 6 mm in maximal dimension with associated DCIS.  Initial margin positive for DCIS with revision margins now clear.  0/2 SLN involved.  Tumor was 88% 3+ ER+, 86 % 3+ [place]+ and HER-2/Frances Negative (1+) with negative FISH testing.  Mammaprint Low Risk Luminal-Type A.    1.  Left Breast Cancer:  -  S/p L Lumpectomy 10-17-22 with SLNBx with revision of margin on 10-26-22 for involvement with DCIS.  Surgical margins clear.    -  Overall her tumor has favorable characteristics including strong hormonal positivity.  -  Given tumor size just >5 mm, I have recommended we send genomic profiling to determine whether risk is sufficient to necessitate treatment with chemotherapy. Happily, testing returned as low risk so chemotherapy was not recommended.  - Return to Dr. Claudia Mccrary for accelerated partial breast irradiation which she completed on 1/27/2023.  She did very well with her  treatment.  - She then required hormonal therapy given strongly hormonally driven cancer.  She is postmenopausal and has an intact uterus.  She had pre-existing osteoporosis with a T score of -3.1 at baseline.  We discussed the reason for hormonal therapy, and different options including treatment with an aromatase inhibitor versus tamoxifen.  We discussed the relative risks benefits and side effects of each, and after conversation, she decided to proceed with tamoxifen therapy.  She does understand that this carries with it a small but increased risk of development of endometrial cancer, knows she will need to see her gynecologist once a year.    -  With respect to the cancer, she is doing well.  No concerning symptoms or exam findings.  -  Will continue Tamoxifen which she is tolerating well.  - We will plan to obtain bilateral diagnostic mammogram in mid May.  -  Survivorship plan discussed at length and she was given a copy today.    2.  Osteoporosis:  -Baseline DEXA scan performed 12/8/2022 showed osteoporosis with a T score of -3.1.  I have recommended treatment with Prolia.  She is takingcalcium with vitamin D twice daily.  - She was previously on high-dose vitamin D replacement but got confused and stopped this when she started Ca/Vit D.  Will restart.    3.  Anxiety / Depression:  -  Will give trial of Lexapro 10 mg daily.  -  Her PCP gave her Klonopid but she says she doesn't like how this makes her feel.    4.  Bell's Palsy:  She has upcoming appt to be seen by specialist in Bell's palsy to see what might be done to help her recover.    5.  H/o melanoma in situ RLE many years ago:  -  Continues to f/u with dermatology yearly and was seen there recently.    6. Episodes of chest pain / palpitations:  -  She says this may be related to anxiety as above, but she has seen her cardiologist to be sure and he has ordered further testing.    7.   Prophylaxis:  -  Ubaldo Thomas received 2022 flu vaccine.  -   Ubaldo Thomas received 4 doses COVID vaccine. and received bivalent booster.  -  Ubaldo Thomas has had colonoscopy in 2018 performed by Dr. Montgomery. She says exam was normal without polyps. Repeat exam will be due in 2028.      8.  ACO / KIARA/Other  Quality measures  -  Ubaldo Thomas received 2022 flu vaccine.  -  Ubaldo Thomas reports a pain score of 0.  Given her pain assessment as noted, treatment options were discussed and the following options were decided upon as a follow-up plan to address the patient's pain: No intervention needed at this time.  -  Current outpatient and discharge medications have been reconciled for the patient.  Reviewed by: Nidhi Porras MD      9.  F/u:  -  Continue Ttamoxifen  - Continue Prolia, Ca/Vit D BID and restart high dose weekly Vit D.  - Trial Lexapro 10 mg daily  - Yearly follow-up with gynecology  - F/u with Cardiology as planned  - Bilateral diagnostic mammogram in Mid May  -  Discussed survivorship plan and she was given a copy today.  - Return to see me in approximately 2 months following mammogram to see how she is doing on Lexapro with CBCD, CMP, TSH, Vit D        I spent 40 minutes with Ubaldo Thomas today.  In the office today, more than 50% of this time was spent face-to-face with her  in counseling / coordination of care, reviewing her medical history and counseling on the current treatment plan.  All questions were answered to her satisfaction      Electronically Signed by: Nidhi Porras MD

## 2023-04-18 ENCOUNTER — LAB (OUTPATIENT)
Dept: ONCOLOGY | Facility: CLINIC | Age: 58
End: 2023-04-18
Payer: COMMERCIAL

## 2023-04-18 ENCOUNTER — OFFICE VISIT (OUTPATIENT)
Dept: ONCOLOGY | Facility: CLINIC | Age: 58
End: 2023-04-18
Payer: COMMERCIAL

## 2023-04-18 VITALS
TEMPERATURE: 97.5 F | DIASTOLIC BLOOD PRESSURE: 74 MMHG | SYSTOLIC BLOOD PRESSURE: 119 MMHG | BODY MASS INDEX: 26.05 KG/M2 | RESPIRATION RATE: 18 BRPM | OXYGEN SATURATION: 98 % | HEIGHT: 63 IN | HEART RATE: 105 BPM | WEIGHT: 147 LBS

## 2023-04-18 DIAGNOSIS — E55.9 VITAMIN D DEFICIENCY: ICD-10-CM

## 2023-04-18 DIAGNOSIS — C50.212 MALIGNANT NEOPLASM OF UPPER-INNER QUADRANT OF LEFT BREAST IN FEMALE, ESTROGEN RECEPTOR POSITIVE: ICD-10-CM

## 2023-04-18 DIAGNOSIS — M81.0 AGE-RELATED OSTEOPOROSIS WITHOUT CURRENT PATHOLOGICAL FRACTURE: ICD-10-CM

## 2023-04-18 DIAGNOSIS — F32.A DEPRESSION, UNSPECIFIED DEPRESSION TYPE: ICD-10-CM

## 2023-04-18 DIAGNOSIS — D03.71 MELANOMA IN SITU OF RIGHT LOWER EXTREMITY INCLUDING HIP: ICD-10-CM

## 2023-04-18 DIAGNOSIS — Z17.0 MALIGNANT NEOPLASM OF UPPER-INNER QUADRANT OF LEFT BREAST IN FEMALE, ESTROGEN RECEPTOR POSITIVE: ICD-10-CM

## 2023-04-18 DIAGNOSIS — G51.0 BELL'S PALSY: ICD-10-CM

## 2023-04-18 DIAGNOSIS — Z17.0 MALIGNANT NEOPLASM OF UPPER-INNER QUADRANT OF LEFT BREAST IN FEMALE, ESTROGEN RECEPTOR POSITIVE: Primary | ICD-10-CM

## 2023-04-18 DIAGNOSIS — R53.83 FATIGUE, UNSPECIFIED TYPE: Primary | ICD-10-CM

## 2023-04-18 DIAGNOSIS — C50.212 MALIGNANT NEOPLASM OF UPPER-INNER QUADRANT OF LEFT BREAST IN FEMALE, ESTROGEN RECEPTOR POSITIVE: Primary | ICD-10-CM

## 2023-04-18 DIAGNOSIS — R53.83 FATIGUE, UNSPECIFIED TYPE: ICD-10-CM

## 2023-04-18 DIAGNOSIS — Z78.0 POSTMENOPAUSAL: ICD-10-CM

## 2023-04-18 LAB
ALBUMIN SERPL-MCNC: 4.3 G/DL (ref 3.5–5.2)
ALBUMIN/GLOB SERPL: 1.5 G/DL
ALP SERPL-CCNC: 61 U/L (ref 39–117)
ALT SERPL W P-5'-P-CCNC: 12 U/L (ref 1–33)
ANION GAP SERPL CALCULATED.3IONS-SCNC: 12.7 MMOL/L (ref 5–15)
AST SERPL-CCNC: 20 U/L (ref 1–32)
BASOPHILS # BLD AUTO: 0.07 10*3/MM3 (ref 0–0.2)
BASOPHILS NFR BLD AUTO: 0.9 % (ref 0–1.5)
BILIRUB SERPL-MCNC: 0.7 MG/DL (ref 0–1.2)
BUN SERPL-MCNC: 17 MG/DL (ref 6–20)
BUN/CREAT SERPL: 22.4 (ref 7–25)
CALCIUM SPEC-SCNC: 8.9 MG/DL (ref 8.6–10.5)
CHLORIDE SERPL-SCNC: 107 MMOL/L (ref 98–107)
CO2 SERPL-SCNC: 24.3 MMOL/L (ref 22–29)
CREAT SERPL-MCNC: 0.76 MG/DL (ref 0.57–1)
DEPRECATED RDW RBC AUTO: 45.3 FL (ref 37–54)
EGFRCR SERPLBLD CKD-EPI 2021: 91 ML/MIN/1.73
EOSINOPHIL # BLD AUTO: 0.08 10*3/MM3 (ref 0–0.4)
EOSINOPHIL NFR BLD AUTO: 1 % (ref 0.3–6.2)
ERYTHROCYTE [DISTWIDTH] IN BLOOD BY AUTOMATED COUNT: 13.2 % (ref 12.3–15.4)
GLOBULIN UR ELPH-MCNC: 2.9 GM/DL
GLUCOSE SERPL-MCNC: 144 MG/DL (ref 65–99)
HCT VFR BLD AUTO: 42.8 % (ref 34–46.6)
HGB BLD-MCNC: 14 G/DL (ref 12–15.9)
IMM GRANULOCYTES # BLD AUTO: 0.02 10*3/MM3 (ref 0–0.05)
IMM GRANULOCYTES NFR BLD AUTO: 0.2 % (ref 0–0.5)
LYMPHOCYTES # BLD AUTO: 2.26 10*3/MM3 (ref 0.7–3.1)
LYMPHOCYTES NFR BLD AUTO: 27.8 % (ref 19.6–45.3)
MCH RBC QN AUTO: 30.6 PG (ref 26.6–33)
MCHC RBC AUTO-ENTMCNC: 32.7 G/DL (ref 31.5–35.7)
MCV RBC AUTO: 93.7 FL (ref 79–97)
MONOCYTES # BLD AUTO: 0.47 10*3/MM3 (ref 0.1–0.9)
MONOCYTES NFR BLD AUTO: 5.8 % (ref 5–12)
NEUTROPHILS NFR BLD AUTO: 5.23 10*3/MM3 (ref 1.7–7)
NEUTROPHILS NFR BLD AUTO: 64.3 % (ref 42.7–76)
NRBC BLD AUTO-RTO: 0 /100 WBC (ref 0–0.2)
PLATELET # BLD AUTO: 279 10*3/MM3 (ref 140–450)
PMV BLD AUTO: 10.2 FL (ref 6–12)
POTASSIUM SERPL-SCNC: 3.6 MMOL/L (ref 3.5–5.2)
PROT SERPL-MCNC: 7.2 G/DL (ref 6–8.5)
RBC # BLD AUTO: 4.57 10*6/MM3 (ref 3.77–5.28)
SODIUM SERPL-SCNC: 144 MMOL/L (ref 136–145)
TSH SERPL DL<=0.05 MIU/L-ACNC: 2.68 UIU/ML (ref 0.27–4.2)
WBC NRBC COR # BLD: 8.13 10*3/MM3 (ref 3.4–10.8)

## 2023-04-18 PROCEDURE — 80050 GENERAL HEALTH PANEL: CPT | Performed by: INTERNAL MEDICINE

## 2023-04-18 PROCEDURE — 82306 VITAMIN D 25 HYDROXY: CPT | Performed by: INTERNAL MEDICINE

## 2023-04-18 RX ORDER — ASPIRIN 81 MG/1
TABLET ORAL
COMMUNITY
Start: 2023-04-04

## 2023-04-18 RX ORDER — DOXYCYCLINE 100 MG/1
TABLET ORAL
COMMUNITY
Start: 2023-04-10

## 2023-04-18 RX ORDER — ATORVASTATIN CALCIUM 20 MG/1
TABLET, FILM COATED ORAL
COMMUNITY
Start: 2023-03-17

## 2023-04-18 RX ORDER — ESCITALOPRAM OXALATE 10 MG/1
10 TABLET ORAL DAILY
Qty: 30 TABLET | Refills: 5 | Status: SHIPPED | OUTPATIENT
Start: 2023-04-18

## 2023-04-18 RX ORDER — ERGOCALCIFEROL 1.25 MG/1
50000 CAPSULE ORAL WEEKLY
Qty: 4 CAPSULE | Refills: 3 | Status: SHIPPED | OUTPATIENT
Start: 2023-04-18

## 2023-04-18 RX ORDER — CLONAZEPAM 0.5 MG/1
TABLET ORAL
COMMUNITY
Start: 2023-04-04

## 2023-04-18 NOTE — PROGRESS NOTES
Venipuncture Blood Specimen Collection  Venipuncture performed in left arm  by Greta Suarez MA with good hemostasis. Patient tolerated the procedure well without complications.   04/18/23   Greta Suarez MA

## 2023-04-19 LAB — 25(OH)D3 SERPL-MCNC: 39.1 NG/ML (ref 30–100)

## 2023-05-25 ENCOUNTER — APPOINTMENT (OUTPATIENT)
Dept: OTHER | Facility: HOSPITAL | Age: 58
End: 2023-05-25
Payer: COMMERCIAL

## 2023-05-25 ENCOUNTER — HOSPITAL ENCOUNTER (OUTPATIENT)
Dept: MAMMOGRAPHY | Facility: HOSPITAL | Age: 58
Discharge: HOME | End: 2023-05-25
Payer: COMMERCIAL

## 2023-05-25 DIAGNOSIS — Z12.31 VISIT FOR SCREENING MAMMOGRAM: ICD-10-CM

## 2023-05-25 DIAGNOSIS — R53.83 FATIGUE, UNSPECIFIED TYPE: ICD-10-CM

## 2023-05-25 DIAGNOSIS — E55.9 VITAMIN D DEFICIENCY: ICD-10-CM

## 2023-05-25 DIAGNOSIS — Z17.0 MALIGNANT NEOPLASM OF UPPER-INNER QUADRANT OF LEFT BREAST IN FEMALE, ESTROGEN RECEPTOR POSITIVE: ICD-10-CM

## 2023-05-25 DIAGNOSIS — C50.212 MALIGNANT NEOPLASM OF UPPER-INNER QUADRANT OF LEFT BREAST IN FEMALE, ESTROGEN RECEPTOR POSITIVE: ICD-10-CM

## 2023-05-25 PROCEDURE — 77066 DX MAMMO INCL CAD BI: CPT | Performed by: RADIOLOGY

## 2023-05-25 PROCEDURE — 77062 BREAST TOMOSYNTHESIS BI: CPT | Performed by: RADIOLOGY

## 2023-05-25 PROCEDURE — 77066 DX MAMMO INCL CAD BI: CPT

## 2023-05-25 PROCEDURE — G0279 TOMOSYNTHESIS, MAMMO: HCPCS

## 2023-08-21 ENCOUNTER — INFUSION (OUTPATIENT)
Dept: ONCOLOGY | Facility: HOSPITAL | Age: 58
End: 2023-08-21
Payer: COMMERCIAL

## 2023-08-21 ENCOUNTER — LAB (OUTPATIENT)
Dept: ONCOLOGY | Facility: HOSPITAL | Age: 58
End: 2023-08-21
Payer: COMMERCIAL

## 2023-08-21 VITALS
TEMPERATURE: 97.1 F | DIASTOLIC BLOOD PRESSURE: 72 MMHG | OXYGEN SATURATION: 98 % | SYSTOLIC BLOOD PRESSURE: 111 MMHG | HEART RATE: 89 BPM | WEIGHT: 145.8 LBS | BODY MASS INDEX: 25.83 KG/M2 | RESPIRATION RATE: 18 BRPM

## 2023-08-21 DIAGNOSIS — Z78.0 POSTMENOPAUSAL: ICD-10-CM

## 2023-08-21 DIAGNOSIS — M81.0 AGE-RELATED OSTEOPOROSIS WITHOUT CURRENT PATHOLOGICAL FRACTURE: Primary | ICD-10-CM

## 2023-08-21 DIAGNOSIS — M81.0 AGE-RELATED OSTEOPOROSIS WITHOUT CURRENT PATHOLOGICAL FRACTURE: ICD-10-CM

## 2023-08-21 LAB
ALBUMIN SERPL-MCNC: 4.1 G/DL (ref 3.5–5.2)
ALBUMIN/GLOB SERPL: 1.6 G/DL
ALP SERPL-CCNC: 45 U/L (ref 39–117)
ALT SERPL W P-5'-P-CCNC: 16 U/L (ref 1–33)
ANION GAP SERPL CALCULATED.3IONS-SCNC: 10.9 MMOL/L (ref 5–15)
AST SERPL-CCNC: 21 U/L (ref 1–32)
BASOPHILS # BLD AUTO: 0.08 10*3/MM3 (ref 0–0.2)
BASOPHILS NFR BLD AUTO: 1 % (ref 0–1.5)
BILIRUB SERPL-MCNC: 0.4 MG/DL (ref 0–1.2)
BUN SERPL-MCNC: 13 MG/DL (ref 6–20)
BUN/CREAT SERPL: 15.5 (ref 7–25)
CALCIUM SPEC-SCNC: 9 MG/DL (ref 8.6–10.5)
CHLORIDE SERPL-SCNC: 107 MMOL/L (ref 98–107)
CO2 SERPL-SCNC: 21.1 MMOL/L (ref 22–29)
CREAT SERPL-MCNC: 0.84 MG/DL (ref 0.57–1)
DEPRECATED RDW RBC AUTO: 47 FL (ref 37–54)
EGFRCR SERPLBLD CKD-EPI 2021: 80.2 ML/MIN/1.73
EOSINOPHIL # BLD AUTO: 0.19 10*3/MM3 (ref 0–0.4)
EOSINOPHIL NFR BLD AUTO: 2.4 % (ref 0.3–6.2)
ERYTHROCYTE [DISTWIDTH] IN BLOOD BY AUTOMATED COUNT: 12.9 % (ref 12.3–15.4)
GLOBULIN UR ELPH-MCNC: 2.5 GM/DL
GLUCOSE SERPL-MCNC: 121 MG/DL (ref 65–99)
HCT VFR BLD AUTO: 41.2 % (ref 34–46.6)
HGB BLD-MCNC: 12.8 G/DL (ref 12–15.9)
IMM GRANULOCYTES # BLD AUTO: 0.02 10*3/MM3 (ref 0–0.05)
IMM GRANULOCYTES NFR BLD AUTO: 0.3 % (ref 0–0.5)
LYMPHOCYTES # BLD AUTO: 2.43 10*3/MM3 (ref 0.7–3.1)
LYMPHOCYTES NFR BLD AUTO: 30.5 % (ref 19.6–45.3)
MAGNESIUM SERPL-MCNC: 2.5 MG/DL (ref 1.6–2.6)
MCH RBC QN AUTO: 30.8 PG (ref 26.6–33)
MCHC RBC AUTO-ENTMCNC: 31.1 G/DL (ref 31.5–35.7)
MCV RBC AUTO: 99 FL (ref 79–97)
MONOCYTES # BLD AUTO: 0.78 10*3/MM3 (ref 0.1–0.9)
MONOCYTES NFR BLD AUTO: 9.8 % (ref 5–12)
NEUTROPHILS NFR BLD AUTO: 4.48 10*3/MM3 (ref 1.7–7)
NEUTROPHILS NFR BLD AUTO: 56 % (ref 42.7–76)
NRBC BLD AUTO-RTO: 0 /100 WBC (ref 0–0.2)
PHOSPHATE SERPL-MCNC: 3.4 MG/DL (ref 2.5–4.5)
PLATELET # BLD AUTO: 235 10*3/MM3 (ref 140–450)
PMV BLD AUTO: 10.7 FL (ref 6–12)
POTASSIUM SERPL-SCNC: 4 MMOL/L (ref 3.5–5.2)
PROT SERPL-MCNC: 6.6 G/DL (ref 6–8.5)
RBC # BLD AUTO: 4.16 10*6/MM3 (ref 3.77–5.28)
SODIUM SERPL-SCNC: 139 MMOL/L (ref 136–145)
WBC NRBC COR # BLD: 7.98 10*3/MM3 (ref 3.4–10.8)

## 2023-08-21 PROCEDURE — 80053 COMPREHEN METABOLIC PANEL: CPT

## 2023-08-21 PROCEDURE — 25010000002 DENOSUMAB 60 MG/ML SOLUTION PREFILLED SYRINGE: Performed by: NURSE PRACTITIONER

## 2023-08-21 PROCEDURE — 84100 ASSAY OF PHOSPHORUS: CPT

## 2023-08-21 PROCEDURE — 96372 THER/PROPH/DIAG INJ SC/IM: CPT

## 2023-08-21 PROCEDURE — 85025 COMPLETE CBC W/AUTO DIFF WBC: CPT

## 2023-08-21 PROCEDURE — 83735 ASSAY OF MAGNESIUM: CPT

## 2023-08-21 RX ADMIN — DENOSUMAB 60 MG: 60 INJECTION SUBCUTANEOUS at 15:39

## 2023-09-20 ENCOUNTER — LAB (OUTPATIENT)
Dept: ONCOLOGY | Facility: CLINIC | Age: 58
End: 2023-09-20
Payer: COMMERCIAL

## 2023-09-20 ENCOUNTER — OFFICE VISIT (OUTPATIENT)
Dept: ONCOLOGY | Facility: CLINIC | Age: 58
End: 2023-09-20
Payer: COMMERCIAL

## 2023-09-20 VITALS
WEIGHT: 146 LBS | TEMPERATURE: 97.5 F | SYSTOLIC BLOOD PRESSURE: 130 MMHG | HEIGHT: 63 IN | BODY MASS INDEX: 25.87 KG/M2 | RESPIRATION RATE: 18 BRPM | OXYGEN SATURATION: 100 % | HEART RATE: 86 BPM | DIASTOLIC BLOOD PRESSURE: 80 MMHG

## 2023-09-20 DIAGNOSIS — Z17.0 MALIGNANT NEOPLASM OF UPPER-INNER QUADRANT OF LEFT BREAST IN FEMALE, ESTROGEN RECEPTOR POSITIVE: ICD-10-CM

## 2023-09-20 DIAGNOSIS — C50.212 MALIGNANT NEOPLASM OF UPPER-INNER QUADRANT OF LEFT BREAST IN FEMALE, ESTROGEN RECEPTOR POSITIVE: Primary | ICD-10-CM

## 2023-09-20 DIAGNOSIS — F32.A DEPRESSION, UNSPECIFIED DEPRESSION TYPE: ICD-10-CM

## 2023-09-20 DIAGNOSIS — E55.9 VITAMIN D DEFICIENCY: ICD-10-CM

## 2023-09-20 DIAGNOSIS — C50.212 MALIGNANT NEOPLASM OF UPPER-INNER QUADRANT OF LEFT BREAST IN FEMALE, ESTROGEN RECEPTOR POSITIVE: ICD-10-CM

## 2023-09-20 DIAGNOSIS — G51.0 BELL'S PALSY: ICD-10-CM

## 2023-09-20 DIAGNOSIS — Z17.0 MALIGNANT NEOPLASM OF LEFT BREAST IN FEMALE, ESTROGEN RECEPTOR POSITIVE, UNSPECIFIED SITE OF BREAST: ICD-10-CM

## 2023-09-20 DIAGNOSIS — D03.71 MELANOMA IN SITU OF RIGHT LOWER EXTREMITY INCLUDING HIP: ICD-10-CM

## 2023-09-20 DIAGNOSIS — R53.83 FATIGUE, UNSPECIFIED TYPE: ICD-10-CM

## 2023-09-20 DIAGNOSIS — Z78.0 POSTMENOPAUSAL: ICD-10-CM

## 2023-09-20 DIAGNOSIS — C50.912 MALIGNANT NEOPLASM OF LEFT BREAST IN FEMALE, ESTROGEN RECEPTOR POSITIVE, UNSPECIFIED SITE OF BREAST: ICD-10-CM

## 2023-09-20 DIAGNOSIS — Z17.0 MALIGNANT NEOPLASM OF UPPER-INNER QUADRANT OF LEFT BREAST IN FEMALE, ESTROGEN RECEPTOR POSITIVE: Primary | ICD-10-CM

## 2023-09-20 DIAGNOSIS — F41.9 ANXIETY: ICD-10-CM

## 2023-09-20 LAB
25(OH)D3 SERPL-MCNC: 59.8 NG/ML (ref 30–100)
ALBUMIN SERPL-MCNC: 4.2 G/DL (ref 3.5–5.2)
ALBUMIN/GLOB SERPL: 1.6 G/DL
ALP SERPL-CCNC: 49 U/L (ref 39–117)
ALT SERPL W P-5'-P-CCNC: 15 U/L (ref 1–33)
ANION GAP SERPL CALCULATED.3IONS-SCNC: 8.4 MMOL/L (ref 5–15)
AST SERPL-CCNC: 19 U/L (ref 1–32)
BASOPHILS # BLD AUTO: 0.07 10*3/MM3 (ref 0–0.2)
BASOPHILS NFR BLD AUTO: 1 % (ref 0–1.5)
BILIRUB SERPL-MCNC: 0.7 MG/DL (ref 0–1.2)
BUN SERPL-MCNC: 16 MG/DL (ref 6–20)
BUN/CREAT SERPL: 18.8 (ref 7–25)
CALCIUM SPEC-SCNC: 9.6 MG/DL (ref 8.6–10.5)
CHLORIDE SERPL-SCNC: 104 MMOL/L (ref 98–107)
CO2 SERPL-SCNC: 27.6 MMOL/L (ref 22–29)
CREAT SERPL-MCNC: 0.85 MG/DL (ref 0.57–1)
DEPRECATED RDW RBC AUTO: 44.4 FL (ref 37–54)
EGFRCR SERPLBLD CKD-EPI 2021: 79 ML/MIN/1.73
EOSINOPHIL # BLD AUTO: 0.11 10*3/MM3 (ref 0–0.4)
EOSINOPHIL NFR BLD AUTO: 1.6 % (ref 0.3–6.2)
ERYTHROCYTE [DISTWIDTH] IN BLOOD BY AUTOMATED COUNT: 12.5 % (ref 12.3–15.4)
GLOBULIN UR ELPH-MCNC: 2.6 GM/DL
GLUCOSE SERPL-MCNC: 90 MG/DL (ref 65–99)
HCT VFR BLD AUTO: 43.4 % (ref 34–46.6)
HGB BLD-MCNC: 13.9 G/DL (ref 12–15.9)
IMM GRANULOCYTES # BLD AUTO: 0.01 10*3/MM3 (ref 0–0.05)
IMM GRANULOCYTES NFR BLD AUTO: 0.1 % (ref 0–0.5)
LYMPHOCYTES # BLD AUTO: 1.63 10*3/MM3 (ref 0.7–3.1)
LYMPHOCYTES NFR BLD AUTO: 24.4 % (ref 19.6–45.3)
MCH RBC QN AUTO: 31 PG (ref 26.6–33)
MCHC RBC AUTO-ENTMCNC: 32 G/DL (ref 31.5–35.7)
MCV RBC AUTO: 96.9 FL (ref 79–97)
MONOCYTES # BLD AUTO: 0.56 10*3/MM3 (ref 0.1–0.9)
MONOCYTES NFR BLD AUTO: 8.4 % (ref 5–12)
NEUTROPHILS NFR BLD AUTO: 4.31 10*3/MM3 (ref 1.7–7)
NEUTROPHILS NFR BLD AUTO: 64.5 % (ref 42.7–76)
NRBC BLD AUTO-RTO: 0 /100 WBC (ref 0–0.2)
PLATELET # BLD AUTO: 265 10*3/MM3 (ref 140–450)
PMV BLD AUTO: 10 FL (ref 6–12)
POTASSIUM SERPL-SCNC: 4.4 MMOL/L (ref 3.5–5.2)
PROT SERPL-MCNC: 6.8 G/DL (ref 6–8.5)
RBC # BLD AUTO: 4.48 10*6/MM3 (ref 3.77–5.28)
SODIUM SERPL-SCNC: 140 MMOL/L (ref 136–145)
TSH SERPL DL<=0.05 MIU/L-ACNC: 3.03 UIU/ML (ref 0.27–4.2)
WBC NRBC COR # BLD: 6.69 10*3/MM3 (ref 3.4–10.8)

## 2023-09-20 PROCEDURE — 82306 VITAMIN D 25 HYDROXY: CPT | Performed by: INTERNAL MEDICINE

## 2023-09-20 PROCEDURE — 99214 OFFICE O/P EST MOD 30 MIN: CPT | Performed by: NURSE PRACTITIONER

## 2023-09-20 PROCEDURE — 80050 GENERAL HEALTH PANEL: CPT | Performed by: INTERNAL MEDICINE

## 2023-09-20 RX ORDER — ERGOCALCIFEROL 1.25 MG/1
50000 CAPSULE ORAL WEEKLY
Qty: 4 CAPSULE | Refills: 3 | Status: SHIPPED | OUTPATIENT
Start: 2023-09-20

## 2023-09-20 NOTE — PROGRESS NOTES
NAME: Ubaldo Thomas    : 1965    DATE: 2023    DIAGNOSIS:   1.  Stage IA (pT1bN0(sn)MX) moderately differentiated invasive ductal carcinoma of the L breast.  Tumor was 6 mm in maximal dimension with associated DCIS.  Initial margin positive for DCIS with revision margins now clear.  0/2 SLN involved.  Tumor was 88% 3+ ER+, 86 % 3+ KS+ and HER-2/Frances Negative (1+) with negative FISH testing.  Mammaprint Low risk Luminal-Type A.    2.  Osteoporosis Baseline DEXA 22 with T score -3.1      TREATMENT HISTORY:    1.  L Lumpectomy, SLNBx (Dr. Jackson) 10-17-22  2.  Revision for clearance of margins 10-26-22  3.  Dr. Mccrary delivered accelerated partial breast irradiation between23 and 23  4. Started Tamoxifen daily 23      CHIEF COMPLAINT:  Follow-up of Breast cancer      HISTORY OF PRESENT ILLNESS:   Ubaldo Thomas is a very pleasant 58 y.o. female who was referred by Dr. Elva Jackson for evaluation and treatment of breast cancer.  She was in her usual state of health when she underwent screening mammogram as below which was abnormal.  She had a biopsy on 2022 revealing an invasive mammary carcinoma, grade 2 with associated DCIS.  Tumor was ER/KS positive and HER2/frances negative.  She underwent genetic testing through [facility] which showed no genetic mutations.  On 10/17/2022 Dr. Jackson took her for left lumpectomy and sentinel lymph node biopsy.  Small tumor was resected as below and margins were clear for invasive ductal carcinoma, but she did have involvement of the anterior margin by DCIS.  She underwent revision on 10/26/2022 with clearance of margins.  She is referred here for discussion of further therapy.    Ms. Thomas is overall doing well.  She denies weight loss, chest pain, shortness of breath, abdominal pain, nausea, vomiting, diarrhea or constipation.  She denies rectal bleeding.  She denies bone pain, headaches or visual change.      INTERVAL HISTORY:    Ms. Thomas presents today for follow up of breast cancer. Overall, she reports that she has been doing well since her last visit. She continues to take Tamoxifen daily and has been tolerating this well. She also receives Prolia q6 month. She continues to take Lexapro daily which has been very helpful for her. She has an upcoming appointment in [place] with an MD who specializes in facial paralysis and she is hopeful to achieve improvement of residual effects of Bell's palsy. She denies any specific complaints today.         PAST MEDICAL HISTORY:  Past Medical History:   Diagnosis Date    Bell's palsy     Breast cancer     left    History of radiation therapy 2023    Left breast APBI    Hx of radiation therapy 02/15/2023    5 treatments, (L) breast    Kidney stones     Skin cancer     Melanoma InSitu       Risk Factors:  Age of Menarche: 13 years old  Age of Menopause: 40s  Prior Breast Disease: She has history of fibrocystic breast disease but no history of abnormal mammograms or biopsies  Pregnancies:    Age of 1st pregnancy: 29 years old  Family History of Breast Cancer: None  Family History of Ovarian Cancer: Maternal grandmother had some type of malignancy in her 60s which was possibly ovarian cancer although this is uncertain  History of HRT use: None        PAST SURGICAL HISTORY:  Past Surgical History:   Procedure Laterality Date    BREAST LUMPECTOMY Left 10/17/2022    KIDNEY STONE SURGERY     - History of removal of melanoma in situ involving right posterior thigh  - 2022 underwent surgery for left facial nerve reanimation for treatment of Bell's palsy.    FAMILY HISTORY:  Family History   Problem Relation Age of Onset    Diabetes Mother     Heart disease Father     Breast cancer Neg Hx        SOCIAL HISTORY:  Social History     Socioeconomic History    Marital status:    Tobacco Use    Smoking status: Never    Smokeless tobacco: Never   Vaping Use    Vaping Use: Never used  "  Substance and Sexual Activity    Alcohol use: Yes     Comment: occasionally    Drug use: Never    Sexual activity: Defer         REVIEW OF SYSTEMS:   A comprehensive 14 point review of systems was performed.  Significant findings as mentioned above.  All other systems reviewed and are negative.      MEDICATIONS:  The current medication list was reviewed in the EMR    Current Outpatient Medications:     atorvastatin (LIPITOR) 20 MG tablet, , Disp: , Rfl:     clonazePAM (KlonoPIN) 0.5 MG tablet, , Disp: , Rfl:     escitalopram (LEXAPRO) 10 MG tablet, Take 1 tablet by mouth Daily., Disp: 30 tablet, Rfl: 5    tamoxifen (NOLVADEX) 20 MG chemo tablet, Take 1 tablet by mouth Daily., Disp: 30 tablet, Rfl: 6    vitamin D (ERGOCALCIFEROL) 1.25 MG (11126 UT) capsule capsule, Take 1 capsule by mouth 1 (One) Time Per Week., Disp: 4 capsule, Rfl: 3    Calcium Carbonate-Vitamin D 600-10 MG-MCG per tablet, Take 1 tablet by mouth 2 (Two) Times a Day. (Patient not taking: Reported on 9/20/2023), Disp: 60 tablet, Rfl: 5    ALLERGIES:    Allergies   Allergen Reactions    Dexamethasone Arrhythmia       PHYSICAL EXAM:  Vitals:    09/20/23 1023   BP: 130/80   Pulse: 86   Resp: 18   Temp: 97.5 °F (36.4 °C)   SpO2: 100%   Weight: 66.2 kg (146 lb)   Height: 160 cm (63\")   PainSc: 0-No pain       Body surface area is 1.69 meters squared.   Body mass index is 25.86 kg/m².     General:  Awake, alert and oriented, appears well today. In no acute distress.  HEENT:  Pupils are equal, round and reactive to light and accommodation, Extra-ocular movements full, Oropharyx clear, mucous membranes moist.  She has asymmetry of the face consistent with left sided Bell's palsy.  Neck:  No JVD, thyromegaly or lymphadenopathy  CV:  Regular rate and rhythm, no murmurs, rubs or gallops  Resp:  Lungs are clear to auscultation bilaterally, no wheezing.  Breast: Status post left lumpectomy and revision as well as left sentinel lymph node biopsy.  She has some " firmer tissue around the sites of her surgeries as well as what appears to be an old biopsy site.  No palpable mass lesions.  No left axillary adenopathy.  Right breast without mass lesions.  Right axilla without adenopathy.  Abd:  Soft, non-tender, non-distended, bowel sounds present, no organomegaly or masses  Ext:  No clubbing, cyanosis, no LE edema  Lymph:  No cervical, supraclavicular, axillary, inguinal or femoral adenopathy  Neuro:  MS as above, CN II-XII intact, grossly non-focal exam      PATHOLOGY:    09/28/2022            FISH Analysis 09/28/2022      10/17/2022      10/26/2022      Genetic Testing 11/07/2022    Mammaprint 11/28/22          ENDOSCOPY:    Colonoscopy 10/16/17          IMAGING:    Bilateral Diagnostic Mammogram with Bilateral Breast Ultrasound (BC) 09/27/2022        Bilateral Breast MRI ([facility]) 09/30/2022      Mammoprint Results 11/28/22      DEXA Bone Density Axial (12/08/2022 14:11)  FINDINGS: The BMD measured at the AP spine L1-L4 is 0.808 gm/cm2 with a  T-score of -3.1.      IMPRESSION:  The patient is considered to be osteoporotic according to the  World Health Organization criteria. Fracture risk is high.     Mammo Diagnostic Digital Tomosynthesis Bilateral With CAD (05/25/2023 12:44)   FINDINGS: The breasts are heterogeneously dense, which may obscure small  masses.     Presumed postoperative changes are now noted in the left 12:00 region  from the patient's interval conservation surgery. Mild skin thickening  is also noted in the left breast which is likely treatment related.  There are a few bilateral scattered benign-appearing calcifications. No  dominant mass, suspicious calcifications, or nonsurgical areas of  architectural distortion are seen in either breast.     IMPRESSION:  Incomplete examination as comparison with prior imaging of  the right breast is needed.        BI-RADS CATEGORY:   0, INCOMPLETE:  Need prior mammograms for comparison        RECOMMENDATION:  We will  obtain the patient's prior outside mammographic  imaging for comparison. Also recommend 6 month follow-up diagnostic left  mammogram.    Addendum  Outside mammographic images dated 08/31/2022, 08/05/2022, and 07/23/2021  were compared to the exam performed at our facility on 05/25/2023. The  fibroglandular pattern of the right breast is stable in appearance.     FINAL ACR BI-RADS CATEGORY: 3, PROBABLY BENIGN     RECOMMENDATION: Recommend 6 month follow-up diagnostic left mammogram.         RECENT LABS:  Lab Results   Component Value Date    WBC 6.69 09/20/2023    HGB 13.9 09/20/2023    HCT 43.4 09/20/2023    MCV 96.9 09/20/2023    RDW 12.5 09/20/2023     09/20/2023    NEUTRORELPCT 64.5 09/20/2023    LYMPHORELPCT 24.4 09/20/2023    MONORELPCT 8.4 09/20/2023    EOSRELPCT 1.6 09/20/2023    BASORELPCT 1.0 09/20/2023    NEUTROABS 4.31 09/20/2023    LYMPHSABS 1.63 09/20/2023       Lab Results   Component Value Date     08/21/2023    K 4.0 08/21/2023    CO2 21.1 (L) 08/21/2023     08/21/2023    BUN 13 08/21/2023    CREATININE 0.84 08/21/2023    GLUCOSE 121 (H) 08/21/2023    CALCIUM 9.0 08/21/2023    ALKPHOS 45 08/21/2023    AST 21 08/21/2023    ALT 16 08/21/2023    BILITOT 0.4 08/21/2023    ALBUMIN 4.1 08/21/2023    PROTEINTOT 6.6 08/21/2023    MG 2.5 08/21/2023    PHOS 3.4 08/21/2023     Lab Results   Component Value Date    FERRITIN 129.40 11/15/2022    IRON 57 11/15/2022    TIBC 350 11/15/2022    LABIRON 16 (L) 11/15/2022    ULYLDRIT17 594 11/15/2022    FOLATE 9.82 11/15/2022     Lab Results   Component Value Date    TSH 2.820 06/20/2023    ORGS01RL 52.4 06/20/2023     Lab Results   Component Value Date     26.2 (H) 11/15/2022     Lab Results   Component Value Date    LABCA2 31.1 11/15/2022           ASSESSMENT & PLAN:  Ubaldo Thomas is a very pleasant 58 y.o. female with recently diagnosed Stage IA (pT1bN0(sn)MX) moderately differentiated invasive ductal carcinoma of the L breast.  Tumor  was 6 mm in maximal dimension with associated DCIS.  Initial margin positive for DCIS with revision margins now clear.  0/2 SLN involved.  Tumor was 88% 3+ ER+, 86 % 3+ [place]+ and HER-2/Frances Negative (1+) with negative FISH testing.  Mammaprint Low Risk Luminal-Type A.    1.  Left Breast Cancer:  - S/p L Lumpectomy 10-17-22 with SLNBx with revision of margin on 10-26-22 for involvement with DCIS.  Surgical margins clear.    - Overall her tumor has favorable characteristics including strong hormonal positivity.  - Given tumor size just >5 mm, recommended we send genomic profiling to determine whether risk is sufficient to necessitate treatment with chemotherapy. Happily, testing returned as low risk so chemotherapy was not recommended.  - Returned to Dr. Claudia Mccrary for accelerated partial breast irradiation which she completed on 1/27/2023.  She did very well with her treatment.  - She then required hormonal therapy given strongly hormonally driven cancer.  She is postmenopausal and has an intact uterus.  She had pre-existing osteoporosis with a T score of -3.1 at baseline.  We discussed the reason for hormonal therapy, and different options including treatment with an aromatase inhibitor versus tamoxifen.  We discussed the relative risks benefits and side effects of each, and after conversation, she decided to proceed with tamoxifen therapy.  She does understand that this carries with it a small but increased risk of development of endometrial cancer and she knows she will need to see her gynecologist once a year. Gynecological exam in June 2023 was normal.   - Exam and labs from today without cause for concern.  - Will continue Tamoxifen daily which she is tolerating well.  - Bilateral diagnostic mammogram  5/25/23 without cause for concern w/ L diagnostic mammogram recommended after 11/25/23. Orders have been placed.  - She will follow up with MD as scheduled in 3 months with mammogram and CBCD, CMP, TSH and Vitamin  D prior.     2.  Osteoporosis:  - Baseline DEXA scan performed 12/8/2022 showed osteoporosis with a T score of -3.1.  Recommended treatment with Prolia.  She is taking calcium with vitamin D twice daily.  She finds it hard to swallow the large pills so I recommended OTC gummy supplement instead.   - Continue weekly high dose Vit D (refill provided today).  - Vitamin D level pending from today.    3.  Anxiety / Depression:  - Doing much better on Lexapro 10 mg daily.  Will continue.    4.   H/o melanoma in situ RLE many years ago:  -  Continues to f/u with dermatology yearly.  Reports had exam there in May 2023 with full skin exam and no concerning findings.    5. Episodes of chest pain / palpitations:  -  Resolved completely w/ rx of depression / anxiety.    6.  Bursitis L hip:  - Resolved at present. She continues to follow with PCP as needed.     7.  Prophylaxis:  -  Ubaldo Thomas received 2022 flu vaccine.  -  Ubaldo Thomas received 4 doses COVID vaccine. and received bivalent booster.  -  Ubaldo Thomas has had colonoscopy in 2018 performed by Dr. Montgomery. She says exam was normal without polyps. Repeat exam will be due in 2028.    8. Follow Up:  - Continue Tamoxifen daily.  - Continue Prolia q6 months with Ca/Vit D and weekly Vit D.   - Continue Lexapro 10 mg daily.  - Left diagnostic mammogram after 11/25/2023, orders placed.   - Yearly follow up with dermatology and gynecology.   - With MD as scheduled with mammogram and CBCD, CMP, TSH and Vitamin D prior.      ACO / KIARA/Other  Quality measures  -  Ubaldo Thomas received 2022 flu vaccine.  -  Ubaldo Thomas reports a pain score of 0.    -  Current outpatient and discharge medications have been reconciled for the patient.  Reviewed by: CHELE Sanchez      I spent  30 minutes with Ubaldo Thomas today.  In the office today, more than 50% of this time was spent face-to-face with her  in counseling /  coordination of care, reviewing her interim medical history and counseling on the current treatment plan.  All questions were answered to her satisfaction.                       Electronically Signed by: CHELE Sanchez

## 2023-09-20 NOTE — PROGRESS NOTES
Venipuncture Blood Specimen Collection  Venipuncture performed in right arm by Greta Suarez MA with good hemostasis. Patient tolerated the procedure well without complications.   09/20/23   Greta Suarez MA

## 2023-10-11 RX ORDER — TAMOXIFEN CITRATE 20 MG/1
20 TABLET ORAL DAILY
Qty: 30 TABLET | Refills: 6 | Status: SHIPPED | OUTPATIENT
Start: 2023-10-11

## 2023-10-11 NOTE — TELEPHONE ENCOUNTER
From: Ubaldo Thomas  To: Office of Nidhi Porras  Sent: 10/11/2023 12:24 PM EDT  Subject: Medication Renewal Request    Refills have been requested for the following medications:     tamoxifen (NOLVADEX) 20 MG chemo tablet [Nidhi Porras]    Preferred pharmacy: [facility] - [id number] - [phone] PH - [phone] FX  Delivery method: Pickup

## 2023-11-28 ENCOUNTER — HOSPITAL ENCOUNTER (OUTPATIENT)
Dept: MAMMOGRAPHY | Facility: HOSPITAL | Age: 58
Discharge: HOME | End: 2023-11-28
Admitting: INTERNAL MEDICINE
Payer: COMMERCIAL

## 2023-11-28 DIAGNOSIS — Z17.0 MALIGNANT NEOPLASM OF LEFT BREAST IN FEMALE, ESTROGEN RECEPTOR POSITIVE, UNSPECIFIED SITE OF BREAST: ICD-10-CM

## 2023-11-28 DIAGNOSIS — Z17.0 MALIGNANT NEOPLASM OF UPPER-INNER QUADRANT OF LEFT BREAST IN FEMALE, ESTROGEN RECEPTOR POSITIVE: ICD-10-CM

## 2023-11-28 DIAGNOSIS — E55.9 VITAMIN D DEFICIENCY: ICD-10-CM

## 2023-11-28 DIAGNOSIS — R53.83 FATIGUE, UNSPECIFIED TYPE: ICD-10-CM

## 2023-11-28 DIAGNOSIS — D03.71 MELANOMA IN SITU OF RIGHT LOWER EXTREMITY INCLUDING HIP: ICD-10-CM

## 2023-11-28 DIAGNOSIS — C50.212 MALIGNANT NEOPLASM OF UPPER-INNER QUADRANT OF LEFT BREAST IN FEMALE, ESTROGEN RECEPTOR POSITIVE: ICD-10-CM

## 2023-11-28 DIAGNOSIS — C50.912 MALIGNANT NEOPLASM OF LEFT BREAST IN FEMALE, ESTROGEN RECEPTOR POSITIVE, UNSPECIFIED SITE OF BREAST: ICD-10-CM

## 2023-11-28 PROCEDURE — 77065 DX MAMMO INCL CAD UNI: CPT

## 2023-11-28 PROCEDURE — G0279 TOMOSYNTHESIS, MAMMO: HCPCS

## 2023-12-26 NOTE — PROGRESS NOTES
NAME: Ubaldo Thomas    : 1965    DATE: 2024    DIAGNOSIS:   1.  Stage IA (pT1bN0(sn)MX) moderately differentiated invasive ductal carcinoma of the L breast.  Tumor was 6 mm in maximal dimension with associated DCIS.  Initial margin positive for DCIS with revision margins now clear.  0/2 SLN involved.  Tumor was 88% 3+ ER+, 86 % 3+ MA+ and HER-2/Frances Negative (1+) with negative FISH testing.  Mammaprint Low risk Luminal-Type A.    2.  Osteoporosis Baseline DEXA 22 with T score -3.1      TREATMENT HISTORY:    1.  L Lumpectomy, SLNBx (Dr. Jackson) 10-17-22  2.  Revision for clearance of margins 10-26-22  3.  Dr. Mccrary delivered accelerated partial breast irradiation between23 and 23  4. Started Tamoxifen daily 23      CHIEF COMPLAINT:  Follow-up of Breast cancer      HISTORY OF PRESENT ILLNESS:   Ubaldo Thomas is a very pleasant 58 y.o. female who was referred by Dr. Elva Jackson for evaluation and treatment of breast cancer.  She was in her usual state of health when she underwent screening mammogram as below which was abnormal.  She had a biopsy on 2022 revealing an invasive mammary carcinoma, grade 2 with associated DCIS.  Tumor was ER/MA positive and HER2/frances negative.  She underwent genetic testing through [facility] which showed no genetic mutations.  On 10/17/2022 Dr. Jackson took her for left lumpectomy and sentinel lymph node biopsy.  Small tumor was resected as below and margins were clear for invasive ductal carcinoma, but she did have involvement of the anterior margin by DCIS.  She underwent revision on 10/26/2022 with clearance of margins.  She is referred here for discussion of further therapy.    Ms. Thomas is overall doing well.  She denies weight loss, chest pain, shortness of breath, abdominal pain, nausea, vomiting, diarrhea or constipation.  She denies rectal bleeding.  She denies bone pain, headaches or visual change.      INTERVAL HISTORY:    Ms. Thomas presents today for follow up of breast cancer. Since her last visit, she has overall been well.  She continues to take Tamoxifen.  She says she was getting hot flashes but these are improved.  She was seen in [place] by an MD specializing in facial paralysis and was treated w/ Botox with improvement in residual effects fo Bell's palsy.  She saw her Gynecologist as planned this past Summer and had L diagnostic mammogram 23 which showed no cause for concern.        PAST MEDICAL HISTORY:  Past Medical History:   Diagnosis Date    Bell's palsy     Breast cancer     left    History of radiation therapy 2023    Left breast APBI    Hx of radiation therapy 02/15/2023    5 treatments, (L) breast    Kidney stones     Skin cancer     Melanoma InSitu       Risk Factors:  Age of Menarche: 13 years old  Age of Menopause: 40s  Prior Breast Disease: She has history of fibrocystic breast disease but no history of abnormal mammograms or biopsies  Pregnancies:    Age of 1st pregnancy: 29 years old  Family History of Breast Cancer: None  Family History of Ovarian Cancer: Maternal grandmother had some type of malignancy in her 60s which was possibly ovarian cancer although this is uncertain  History of HRT use: None        PAST SURGICAL HISTORY:  Past Surgical History:   Procedure Laterality Date    BREAST LUMPECTOMY Left 10/17/2022    KIDNEY STONE SURGERY     - History of removal of melanoma in situ involving right posterior thigh  - 2022 underwent surgery for left facial nerve reanimation for treatment of Bell's palsy.    FAMILY HISTORY:  Family History   Problem Relation Age of Onset    Diabetes Mother     Heart disease Father     Breast cancer Neg Hx        SOCIAL HISTORY:  Social History     Socioeconomic History    Marital status:    Tobacco Use    Smoking status: Never    Smokeless tobacco: Never   Vaping Use    Vaping Use: Never used   Substance and Sexual Activity    Alcohol use:  Yes     Comment: occasionally    Drug use: Never    Sexual activity: Defer         REVIEW OF SYSTEMS:   A comprehensive 14 point review of systems was performed.  Significant findings as mentioned above.  All other systems reviewed and are negative.      MEDICATIONS:  The current medication list was reviewed in the EMR    Current Outpatient Medications:     atorvastatin (LIPITOR) 20 MG tablet, , Disp: , Rfl:     clonazePAM (KlonoPIN) 0.5 MG tablet, , Disp: , Rfl:     escitalopram (LEXAPRO) 10 MG tablet, Take 1 tablet by mouth Daily., Disp: 90 tablet, Rfl: 3    tamoxifen (NOLVADEX) 20 MG chemo tablet, Take 1 tablet by mouth Daily., Disp: 90 tablet, Rfl: 3    vitamin D (ERGOCALCIFEROL) 1.25 MG (35052 UT) capsule capsule, Take 1 capsule by mouth 1 (One) Time Per Week., Disp: 4 capsule, Rfl: 3    Calcium Carbonate-Vitamin D 600-10 MG-MCG per tablet, Take 1 tablet by mouth 2 (Two) Times a Day. (Patient not taking: Reported on 1/2/2024), Disp: 60 tablet, Rfl: 5    ALLERGIES:    Allergies   Allergen Reactions    Dexamethasone Arrhythmia       PHYSICAL EXAM:  Vitals:    01/02/24 1259   BP: 135/77   Pulse: 89   Resp: 18   Temp: 97.1 °F (36.2 °C)   TempSrc: Temporal   SpO2: 98%   Weight: 70 kg (154 lb 6.4 oz)   PainSc: 0-No pain         Body surface area is 1.73 meters squared.   Body mass index is 27.35 kg/m².     General:  Awake, alert and oriented, appears well today. In no acute distress.  HEENT:  Pupils are equal, round and reactive to light and accommodation, Extra-ocular movements full, Oropharyx clear, mucous membranes moist.  She has asymmetry of the face consistent with left sided Bell's palsy but effect is lessened s/p Botox treatment.  Neck:  No JVD, thyromegaly or lymphadenopathy  CV:  Regular rate and rhythm, no murmurs, rubs or gallops  Resp:  Lungs are clear to auscultation bilaterally no crackles  Breast: Status post left lumpectomy and revision as well as left sentinel lymph node biopsy.  She has some firmer  tissue around the sites of her surgeries as well as what appears to be an old biopsy site.  No palpable mass lesions.  No left axillary adenopathy.  Right breast without mass lesions.  Right axilla without adenopathy.  Abd:  Soft, non-tender, non-distended, bowel sounds present, no organomegaly or masses  Ext:  No clubbing, cyanosis, or lower extremity edema.  Lymph:  No cervical, supraclavicular, axillary, inguinal or femoral adenopathy  Neuro:  MS as above, CN II-XII intact, grossly non-focal exam      PATHOLOGY:    09/28/2022            FISH Analysis 09/28/2022      10/17/2022      10/26/2022      Genetic Testing 11/07/2022    Mammaprint 11/28/22          ENDOSCOPY:    Colonoscopy 10/16/17          IMAGING:    Bilateral Diagnostic Mammogram with Bilateral Breast Ultrasound ([facility]) 09/27/2022        Bilateral Breast MRI ([facility]) 09/30/2022      Mammoprint Results 11/28/22      DEXA Bone Density Axial (12/08/2022 14:11)  FINDINGS: The BMD measured at the AP spine L1-L4 is 0.808 gm/cm2 with a  T-score of -3.1.      IMPRESSION:  The patient is considered to be osteoporotic according to the  World Health Organization criteria. Fracture risk is high.     Mammo Diagnostic Digital Tomosynthesis Bilateral With CAD (05/25/2023 12:44)   FINDINGS: The breasts are heterogeneously dense, which may obscure small  masses.     Presumed postoperative changes are now noted in the left 12:00 region  from the patient's interval conservation surgery. Mild skin thickening  is also noted in the left breast which is likely treatment related.  There are a few bilateral scattered benign-appearing calcifications. No  dominant mass, suspicious calcifications, or nonsurgical areas of  architectural distortion are seen in either breast.     IMPRESSION:  Incomplete examination as comparison with prior imaging of  the right breast is needed.        BI-RADS CATEGORY:   0, INCOMPLETE:  Need prior mammograms for comparison        RECOMMENDATION:  We  will obtain the patient's prior outside mammographic  imaging for comparison. Also recommend 6 month follow-up diagnostic left  mammogram.    Addendum  Outside mammographic images dated 08/31/2022, 08/05/2022, and 07/23/2021  were compared to the exam performed at our facility on 05/25/2023. The  fibroglandular pattern of the right breast is stable in appearance.     FINAL ACR BI-RADS CATEGORY: 3, PROBABLY BENIGN     RECOMMENDATION: Recommend 6 month follow-up diagnostic left mammogram.     Mammo Diagnostic Digital Tomosynthesis Left With CAD (11/28/2023 10:22)   FINDINGS: The left breast heterogeneously dense, which may obscure small  masses.     Postoperative changes being followed in the left 12:00 region are stable  in appearance as is the remaining fibroglandular pattern. There are  stable scattered calcifications. Mild skin thickening is again noted and  likely treatment related. No new or suspicious findings are identified.     IMPRESSION:  Probably benign left mammographic findings. Recommend  continued follow-up.        BI-RADS CATEGORY: 3, PROBABLY BENIGN    RECENT LABS:  Lab Results   Component Value Date    WBC 8.27 01/02/2024    HGB 13.4 01/02/2024    HCT 42.0 01/02/2024    MCV 96.6 01/02/2024    RDW 12.8 01/02/2024     01/02/2024    NEUTRORELPCT 64.5 01/02/2024    LYMPHORELPCT 25.4 01/02/2024    MONORELPCT 7.3 01/02/2024    EOSRELPCT 1.7 01/02/2024    BASORELPCT 0.7 01/02/2024    NEUTROABS 5.34 01/02/2024    LYMPHSABS 2.10 01/02/2024       Lab Results   Component Value Date     01/02/2024    K 4.1 01/02/2024    CO2 29.8 (H) 01/02/2024     01/02/2024    BUN 17 01/02/2024    CREATININE 0.80 01/02/2024    GLUCOSE 120 (H) 01/02/2024    CALCIUM 9.2 01/02/2024    ALKPHOS 51 01/02/2024    AST 22 01/02/2024    ALT 17 01/02/2024    BILITOT 0.5 01/02/2024    ALBUMIN 4.1 01/02/2024    PROTEINTOT 6.7 01/02/2024    MG 2.5 08/21/2023    PHOS 3.4 08/21/2023     Lab Results   Component Value Date     FERRITIN 129.40 11/15/2022    IRON 57 11/15/2022    TIBC 350 11/15/2022    LABIRON 16 (L) 11/15/2022    AKLFQGZI34 594 11/15/2022    FOLATE 9.82 11/15/2022     Lab Results   Component Value Date    TSH 2.380 01/02/2024    WODS59PX 59.8 09/20/2023     Lab Results   Component Value Date     26.2 (H) 11/15/2022     Lab Results   Component Value Date    LABCA2 31.1 11/15/2022           ASSESSMENT & PLAN:  Ubaldo Thomas is a very pleasant 58 y.o. female with recently diagnosed Stage IA (pT1bN0(sn)MX) moderately differentiated invasive ductal carcinoma of the L breast.  Tumor was 6 mm in maximal dimension with associated DCIS.  Initial margin positive for DCIS with revision margins now clear.  0/2 SLN involved.  Tumor was 88% 3+ ER+, 86 % 3+ NM+ and HER-2/Frances Negative (1+) with negative FISH testing.  Mammaprint Low Risk Luminal-Type A.    1.  Left Breast Cancer:  - S/p L Lumpectomy 10-17-22 with SLNBx with revision of margin on 10-26-22 for involvement with DCIS.  Surgical margins clear.    - Overall her tumor has favorable characteristics including strong hormonal positivity.  - Given tumor size just >5 mm, recommended we send genomic profiling to determine whether risk is sufficient to necessitate treatment with chemotherapy. Happily, testing returned as low risk so chemotherapy was not recommended.  - Returned to Dr. Claudia Mccrary for accelerated partial breast irradiation which she completed on 1/27/2023.  She did very well with her treatment.  - She then required hormonal therapy given strongly hormonally driven cancer.  She is postmenopausal and has an intact uterus.  She had pre-existing osteoporosis with a T score of -3.1 at baseline.  We discussed the reason for hormonal therapy, and different options including treatment with an aromatase inhibitor versus tamoxifen.  We discussed the relative risks benefits and side effects of each, and after conversation, she decided to proceed with tamoxifen  therapy.  She does understand that this carries with it a small but increased risk of development of endometrial cancer and she knows she will need to see her gynecologist once a year. Gynecological exam in June 2023 was normal.   - Exam and labs from today without cause for concern.  - Will continue Tamoxifen daily which she is tolerating well.  - Bilateral diagnostic mammogram  5/25/23 without cause for concern w/ L diagnostic mammogram recommended after 11/25/23. This was performed 11/2823 and was without concerning findings.  Oj diagnostic mammogram will be due p 5/25/24.  Will order.    - She will follow up with APRN for toxicity check and exam in 3 months with  CBCD, CMP, TSH and Vitamin D and I will see her back in 6 months with these same labs.    2.  Osteoporosis:  - Baseline DEXA scan performed 12/8/2022 showed osteoporosis with a T score of -3.1.  Recommended treatment with Prolia.  She is taking calcium with vitamin D twice daily.  She finds it hard to swallow the large pills so I recommended OTC gummy supplement instead.   - Continue weekly high dose Vit D (refill provided today).  - Vitamin D level pending from today.  -  Repeat DEXA scan will be due p 12/8/24.    3.  Anxiety / Depression:  - Doing much better on Lexapro 10 mg daily.  Will continue. Refilled today.    4.   H/o melanoma in situ RLE many years ago:  -  Continues to f/u with dermatology yearly.  Reports had exam there ([facility] in [place])  in May 2023 with full skin exam and no concerning findings.    5.  Prophylaxis:  -  Ubaldo Thomas received 2023 flu vaccine.  -  Ubaldo Thomas received 4 doses COVID vaccine. and received bivalent booster.  She also received 2023 Fall Booster.  -  Ubaldo Thomas has had colonoscopy in 2018 performed by Dr. Montgomery. She says exam was normal without polyps. Repeat exam will be due in 2028.    8. Follow Up:  - Continue Tamoxifen daily.  - Continue Prolia q6  months with Ca/Vit D and weekly Vit D.   - Continue Lexapro 10 mg daily.  Refilled today.  - Oj diagnostic mammogram after 5/25/24, will order today   - Yearly follow up with dermatology and gynecology.   -  F/u with APRN in 3 months with CBCD, CMP, TSH, Vit D.  - F/u with me in 6 months with CBCD, CMP, TSH and Vitamin D prior.      9.  ACO / KIARA/Other  Quality measures  -  Ubaldo Thomas received 2023 flu vaccine.  -  Ubaldo Thomas reports a pain score of 0.  Given her pain assessment as noted, treatment options were discussed and the following options were decided upon as a follow-up plan to address the patient's pain:  No intervention needed at this time .  -  Current outpatient and discharge medications have been reconciled for the patient.  Reviewed by: Nidhi Porras MD      I spent  30 minutes with Ubaldo Thomas today.  In the office today, more than 50% of this time was spent face-to-face with her  in counseling / coordination of care, reviewing her interim medical history and counseling on the current treatment plan.  All questions were answered to her satisfaction.         Electronically Signed by:Nidhi Porras MD

## 2024-01-02 ENCOUNTER — OFFICE VISIT (OUTPATIENT)
Dept: ONCOLOGY | Facility: CLINIC | Age: 59
End: 2024-01-02
Payer: COMMERCIAL

## 2024-01-02 ENCOUNTER — LAB (OUTPATIENT)
Dept: ONCOLOGY | Facility: CLINIC | Age: 59
End: 2024-01-02
Payer: COMMERCIAL

## 2024-01-02 VITALS
DIASTOLIC BLOOD PRESSURE: 77 MMHG | BODY MASS INDEX: 27.35 KG/M2 | WEIGHT: 154.4 LBS | TEMPERATURE: 97.1 F | HEART RATE: 89 BPM | SYSTOLIC BLOOD PRESSURE: 135 MMHG | RESPIRATION RATE: 18 BRPM | OXYGEN SATURATION: 98 %

## 2024-01-02 DIAGNOSIS — E55.9 VITAMIN D DEFICIENCY: ICD-10-CM

## 2024-01-02 DIAGNOSIS — D03.71 MELANOMA IN SITU OF RIGHT LOWER EXTREMITY INCLUDING HIP: ICD-10-CM

## 2024-01-02 DIAGNOSIS — R53.83 FATIGUE, UNSPECIFIED TYPE: ICD-10-CM

## 2024-01-02 DIAGNOSIS — C50.912 MALIGNANT NEOPLASM OF LEFT BREAST IN FEMALE, ESTROGEN RECEPTOR POSITIVE, UNSPECIFIED SITE OF BREAST: ICD-10-CM

## 2024-01-02 DIAGNOSIS — Z17.0 MALIGNANT NEOPLASM OF LEFT BREAST IN FEMALE, ESTROGEN RECEPTOR POSITIVE, UNSPECIFIED SITE OF BREAST: Primary | ICD-10-CM

## 2024-01-02 DIAGNOSIS — C50.912 MALIGNANT NEOPLASM OF LEFT BREAST IN FEMALE, ESTROGEN RECEPTOR POSITIVE, UNSPECIFIED SITE OF BREAST: Primary | ICD-10-CM

## 2024-01-02 DIAGNOSIS — F32.A DEPRESSION, UNSPECIFIED DEPRESSION TYPE: ICD-10-CM

## 2024-01-02 DIAGNOSIS — C50.212 MALIGNANT NEOPLASM OF UPPER-INNER QUADRANT OF LEFT BREAST IN FEMALE, ESTROGEN RECEPTOR POSITIVE: ICD-10-CM

## 2024-01-02 DIAGNOSIS — Z17.0 MALIGNANT NEOPLASM OF UPPER-INNER QUADRANT OF LEFT BREAST IN FEMALE, ESTROGEN RECEPTOR POSITIVE: ICD-10-CM

## 2024-01-02 DIAGNOSIS — M81.0 AGE-RELATED OSTEOPOROSIS WITHOUT CURRENT PATHOLOGICAL FRACTURE: ICD-10-CM

## 2024-01-02 DIAGNOSIS — Z17.0 MALIGNANT NEOPLASM OF LEFT BREAST IN FEMALE, ESTROGEN RECEPTOR POSITIVE, UNSPECIFIED SITE OF BREAST: ICD-10-CM

## 2024-01-02 LAB
ALBUMIN SERPL-MCNC: 4.1 G/DL (ref 3.5–5.2)
ALBUMIN/GLOB SERPL: 1.6 G/DL
ALP SERPL-CCNC: 51 U/L (ref 39–117)
ALT SERPL W P-5'-P-CCNC: 17 U/L (ref 1–33)
ANION GAP SERPL CALCULATED.3IONS-SCNC: 7.2 MMOL/L (ref 5–15)
AST SERPL-CCNC: 22 U/L (ref 1–32)
BASOPHILS # BLD AUTO: 0.06 10*3/MM3 (ref 0–0.2)
BASOPHILS NFR BLD AUTO: 0.7 % (ref 0–1.5)
BILIRUB SERPL-MCNC: 0.5 MG/DL (ref 0–1.2)
BUN SERPL-MCNC: 17 MG/DL (ref 6–20)
BUN/CREAT SERPL: 21.3 (ref 7–25)
CALCIUM SPEC-SCNC: 9.2 MG/DL (ref 8.6–10.5)
CHLORIDE SERPL-SCNC: 105 MMOL/L (ref 98–107)
CO2 SERPL-SCNC: 29.8 MMOL/L (ref 22–29)
CREAT SERPL-MCNC: 0.8 MG/DL (ref 0.57–1)
DEPRECATED RDW RBC AUTO: 45.8 FL (ref 37–54)
EGFRCR SERPLBLD CKD-EPI 2021: 85 ML/MIN/1.73
EOSINOPHIL # BLD AUTO: 0.14 10*3/MM3 (ref 0–0.4)
EOSINOPHIL NFR BLD AUTO: 1.7 % (ref 0.3–6.2)
ERYTHROCYTE [DISTWIDTH] IN BLOOD BY AUTOMATED COUNT: 12.8 % (ref 12.3–15.4)
GLOBULIN UR ELPH-MCNC: 2.6 GM/DL
GLUCOSE SERPL-MCNC: 120 MG/DL (ref 65–99)
HCT VFR BLD AUTO: 42 % (ref 34–46.6)
HGB BLD-MCNC: 13.4 G/DL (ref 12–15.9)
IMM GRANULOCYTES # BLD AUTO: 0.03 10*3/MM3 (ref 0–0.05)
IMM GRANULOCYTES NFR BLD AUTO: 0.4 % (ref 0–0.5)
LYMPHOCYTES # BLD AUTO: 2.1 10*3/MM3 (ref 0.7–3.1)
LYMPHOCYTES NFR BLD AUTO: 25.4 % (ref 19.6–45.3)
MCH RBC QN AUTO: 30.8 PG (ref 26.6–33)
MCHC RBC AUTO-ENTMCNC: 31.9 G/DL (ref 31.5–35.7)
MCV RBC AUTO: 96.6 FL (ref 79–97)
MONOCYTES # BLD AUTO: 0.6 10*3/MM3 (ref 0.1–0.9)
MONOCYTES NFR BLD AUTO: 7.3 % (ref 5–12)
NEUTROPHILS NFR BLD AUTO: 5.34 10*3/MM3 (ref 1.7–7)
NEUTROPHILS NFR BLD AUTO: 64.5 % (ref 42.7–76)
NRBC BLD AUTO-RTO: 0 /100 WBC (ref 0–0.2)
PLATELET # BLD AUTO: 269 10*3/MM3 (ref 140–450)
PMV BLD AUTO: 9.6 FL (ref 6–12)
POTASSIUM SERPL-SCNC: 4.1 MMOL/L (ref 3.5–5.2)
PROT SERPL-MCNC: 6.7 G/DL (ref 6–8.5)
RBC # BLD AUTO: 4.35 10*6/MM3 (ref 3.77–5.28)
SODIUM SERPL-SCNC: 142 MMOL/L (ref 136–145)
TSH SERPL DL<=0.05 MIU/L-ACNC: 2.38 UIU/ML (ref 0.27–4.2)
WBC NRBC COR # BLD AUTO: 8.27 10*3/MM3 (ref 3.4–10.8)

## 2024-01-02 PROCEDURE — 99214 OFFICE O/P EST MOD 30 MIN: CPT | Performed by: INTERNAL MEDICINE

## 2024-01-02 PROCEDURE — 80050 GENERAL HEALTH PANEL: CPT | Performed by: INTERNAL MEDICINE

## 2024-01-02 PROCEDURE — 82306 VITAMIN D 25 HYDROXY: CPT | Performed by: INTERNAL MEDICINE

## 2024-01-02 RX ORDER — ERGOCALCIFEROL 1.25 MG/1
50000 CAPSULE ORAL WEEKLY
Qty: 4 CAPSULE | Refills: 3 | Status: SHIPPED | OUTPATIENT
Start: 2024-01-02

## 2024-01-02 RX ORDER — TAMOXIFEN CITRATE 20 MG/1
20 TABLET ORAL DAILY
Qty: 90 TABLET | Refills: 3 | Status: SHIPPED | OUTPATIENT
Start: 2024-01-02

## 2024-01-02 RX ORDER — ESCITALOPRAM OXALATE 10 MG/1
10 TABLET ORAL DAILY
Qty: 90 TABLET | Refills: 3 | Status: SHIPPED | OUTPATIENT
Start: 2024-01-02

## 2024-01-02 NOTE — PROGRESS NOTES
Venipuncture Blood Specimen Collection  Venipuncture performed in left arm by Dania Ma MA with good hemostasis. Patient tolerated the procedure well without complications.   01/02/24   Dania Ma MA

## 2024-01-03 LAB — 25(OH)D3 SERPL-MCNC: 57.3 NG/ML (ref 30–100)

## 2024-02-16 DIAGNOSIS — Z78.0 POSTMENOPAUSAL: Primary | ICD-10-CM

## 2024-02-16 DIAGNOSIS — M81.0 AGE-RELATED OSTEOPOROSIS WITHOUT CURRENT PATHOLOGICAL FRACTURE: ICD-10-CM

## 2024-02-26 ENCOUNTER — INFUSION (OUTPATIENT)
Dept: ONCOLOGY | Facility: HOSPITAL | Age: 59
End: 2024-02-26
Payer: COMMERCIAL

## 2024-02-26 ENCOUNTER — LAB (OUTPATIENT)
Dept: ONCOLOGY | Facility: HOSPITAL | Age: 59
End: 2024-02-26
Payer: COMMERCIAL

## 2024-02-26 VITALS
RESPIRATION RATE: 18 BRPM | DIASTOLIC BLOOD PRESSURE: 77 MMHG | SYSTOLIC BLOOD PRESSURE: 117 MMHG | TEMPERATURE: 96.9 F | HEART RATE: 80 BPM | BODY MASS INDEX: 27.28 KG/M2 | WEIGHT: 154 LBS | OXYGEN SATURATION: 97 %

## 2024-02-26 DIAGNOSIS — M81.0 AGE-RELATED OSTEOPOROSIS WITHOUT CURRENT PATHOLOGICAL FRACTURE: ICD-10-CM

## 2024-02-26 DIAGNOSIS — Z78.0 POSTMENOPAUSAL: ICD-10-CM

## 2024-02-26 DIAGNOSIS — M81.0 AGE-RELATED OSTEOPOROSIS WITHOUT CURRENT PATHOLOGICAL FRACTURE: Primary | ICD-10-CM

## 2024-02-26 LAB
ALBUMIN SERPL-MCNC: 4.2 G/DL (ref 3.5–5.2)
ALBUMIN/GLOB SERPL: 1.8 G/DL
ALP SERPL-CCNC: 50 U/L (ref 39–117)
ALT SERPL W P-5'-P-CCNC: 8 U/L (ref 1–33)
ANION GAP SERPL CALCULATED.3IONS-SCNC: 10.9 MMOL/L (ref 5–15)
AST SERPL-CCNC: 20 U/L (ref 1–32)
BASOPHILS # BLD AUTO: 0.06 10*3/MM3 (ref 0–0.2)
BASOPHILS NFR BLD AUTO: 0.7 % (ref 0–1.5)
BILIRUB SERPL-MCNC: 0.4 MG/DL (ref 0–1.2)
BUN SERPL-MCNC: 13 MG/DL (ref 6–20)
BUN/CREAT SERPL: 18.6 (ref 7–25)
CALCIUM SPEC-SCNC: 9.3 MG/DL (ref 8.6–10.5)
CHLORIDE SERPL-SCNC: 105 MMOL/L (ref 98–107)
CO2 SERPL-SCNC: 24.1 MMOL/L (ref 22–29)
CREAT SERPL-MCNC: 0.7 MG/DL (ref 0.57–1)
DEPRECATED RDW RBC AUTO: 45.1 FL (ref 37–54)
EGFRCR SERPLBLD CKD-EPI 2021: 99.8 ML/MIN/1.73
EOSINOPHIL # BLD AUTO: 0.14 10*3/MM3 (ref 0–0.4)
EOSINOPHIL NFR BLD AUTO: 1.6 % (ref 0.3–6.2)
ERYTHROCYTE [DISTWIDTH] IN BLOOD BY AUTOMATED COUNT: 12.8 % (ref 12.3–15.4)
GLOBULIN UR ELPH-MCNC: 2.4 GM/DL
GLUCOSE SERPL-MCNC: 134 MG/DL (ref 65–99)
HCT VFR BLD AUTO: 40.9 % (ref 34–46.6)
HGB BLD-MCNC: 13.3 G/DL (ref 12–15.9)
IMM GRANULOCYTES # BLD AUTO: 0.03 10*3/MM3 (ref 0–0.05)
IMM GRANULOCYTES NFR BLD AUTO: 0.3 % (ref 0–0.5)
LYMPHOCYTES # BLD AUTO: 2.4 10*3/MM3 (ref 0.7–3.1)
LYMPHOCYTES NFR BLD AUTO: 27.5 % (ref 19.6–45.3)
MAGNESIUM SERPL-MCNC: 2.3 MG/DL (ref 1.6–2.6)
MCH RBC QN AUTO: 31.1 PG (ref 26.6–33)
MCHC RBC AUTO-ENTMCNC: 32.5 G/DL (ref 31.5–35.7)
MCV RBC AUTO: 95.8 FL (ref 79–97)
MONOCYTES # BLD AUTO: 0.76 10*3/MM3 (ref 0.1–0.9)
MONOCYTES NFR BLD AUTO: 8.7 % (ref 5–12)
NEUTROPHILS NFR BLD AUTO: 5.33 10*3/MM3 (ref 1.7–7)
NEUTROPHILS NFR BLD AUTO: 61.2 % (ref 42.7–76)
NRBC BLD AUTO-RTO: 0 /100 WBC (ref 0–0.2)
PHOSPHATE SERPL-MCNC: 4 MG/DL (ref 2.5–4.5)
PLATELET # BLD AUTO: 263 10*3/MM3 (ref 140–450)
PMV BLD AUTO: 9.8 FL (ref 6–12)
POTASSIUM SERPL-SCNC: 3.9 MMOL/L (ref 3.5–5.2)
PROT SERPL-MCNC: 6.6 G/DL (ref 6–8.5)
RBC # BLD AUTO: 4.27 10*6/MM3 (ref 3.77–5.28)
SODIUM SERPL-SCNC: 140 MMOL/L (ref 136–145)
WBC NRBC COR # BLD AUTO: 8.72 10*3/MM3 (ref 3.4–10.8)

## 2024-02-26 PROCEDURE — 83735 ASSAY OF MAGNESIUM: CPT

## 2024-02-26 PROCEDURE — 85025 COMPLETE CBC W/AUTO DIFF WBC: CPT

## 2024-02-26 PROCEDURE — 80053 COMPREHEN METABOLIC PANEL: CPT

## 2024-02-26 PROCEDURE — 96372 THER/PROPH/DIAG INJ SC/IM: CPT

## 2024-02-26 PROCEDURE — 84100 ASSAY OF PHOSPHORUS: CPT

## 2024-02-26 PROCEDURE — 25010000002 DENOSUMAB 60 MG/ML SOLUTION PREFILLED SYRINGE: Performed by: INTERNAL MEDICINE

## 2024-02-26 RX ADMIN — DENOSUMAB 60 MG: 60 INJECTION SUBCUTANEOUS at 15:20

## 2024-04-08 ENCOUNTER — LAB (OUTPATIENT)
Dept: ONCOLOGY | Facility: CLINIC | Age: 59
End: 2024-04-08
Payer: COMMERCIAL

## 2024-04-08 ENCOUNTER — OFFICE VISIT (OUTPATIENT)
Dept: ONCOLOGY | Facility: CLINIC | Age: 59
End: 2024-04-08
Payer: COMMERCIAL

## 2024-04-08 VITALS
RESPIRATION RATE: 20 BRPM | WEIGHT: 159.6 LBS | TEMPERATURE: 98 F | OXYGEN SATURATION: 96 % | DIASTOLIC BLOOD PRESSURE: 76 MMHG | HEIGHT: 63 IN | BODY MASS INDEX: 28.28 KG/M2 | HEART RATE: 92 BPM | SYSTOLIC BLOOD PRESSURE: 119 MMHG

## 2024-04-08 DIAGNOSIS — C50.212 MALIGNANT NEOPLASM OF UPPER-INNER QUADRANT OF LEFT BREAST IN FEMALE, ESTROGEN RECEPTOR POSITIVE: ICD-10-CM

## 2024-04-08 DIAGNOSIS — Z17.0 MALIGNANT NEOPLASM OF UPPER-INNER QUADRANT OF LEFT BREAST IN FEMALE, ESTROGEN RECEPTOR POSITIVE: Primary | ICD-10-CM

## 2024-04-08 DIAGNOSIS — C50.212 MALIGNANT NEOPLASM OF UPPER-INNER QUADRANT OF LEFT BREAST IN FEMALE, ESTROGEN RECEPTOR POSITIVE: Primary | ICD-10-CM

## 2024-04-08 DIAGNOSIS — D03.71 MELANOMA IN SITU OF RIGHT LOWER EXTREMITY INCLUDING HIP: ICD-10-CM

## 2024-04-08 DIAGNOSIS — E55.9 VITAMIN D DEFICIENCY: ICD-10-CM

## 2024-04-08 DIAGNOSIS — Z17.0 MALIGNANT NEOPLASM OF LEFT BREAST IN FEMALE, ESTROGEN RECEPTOR POSITIVE, UNSPECIFIED SITE OF BREAST: ICD-10-CM

## 2024-04-08 DIAGNOSIS — Z17.0 MALIGNANT NEOPLASM OF UPPER-INNER QUADRANT OF LEFT BREAST IN FEMALE, ESTROGEN RECEPTOR POSITIVE: ICD-10-CM

## 2024-04-08 DIAGNOSIS — F32.A DEPRESSION, UNSPECIFIED DEPRESSION TYPE: ICD-10-CM

## 2024-04-08 DIAGNOSIS — R53.83 FATIGUE, UNSPECIFIED TYPE: ICD-10-CM

## 2024-04-08 DIAGNOSIS — C50.912 MALIGNANT NEOPLASM OF LEFT BREAST IN FEMALE, ESTROGEN RECEPTOR POSITIVE, UNSPECIFIED SITE OF BREAST: ICD-10-CM

## 2024-04-08 DIAGNOSIS — M81.0 AGE-RELATED OSTEOPOROSIS WITHOUT CURRENT PATHOLOGICAL FRACTURE: ICD-10-CM

## 2024-04-08 LAB
ALBUMIN SERPL-MCNC: 4.3 G/DL (ref 3.5–5.2)
ALBUMIN/GLOB SERPL: 1.7 G/DL
ALP SERPL-CCNC: 53 U/L (ref 39–117)
ALT SERPL W P-5'-P-CCNC: 20 U/L (ref 1–33)
ANION GAP SERPL CALCULATED.3IONS-SCNC: 13.7 MMOL/L (ref 5–15)
AST SERPL-CCNC: 23 U/L (ref 1–32)
BASOPHILS # BLD AUTO: 0.05 10*3/MM3 (ref 0–0.2)
BASOPHILS NFR BLD AUTO: 0.6 % (ref 0–1.5)
BILIRUB SERPL-MCNC: 0.4 MG/DL (ref 0–1.2)
BUN SERPL-MCNC: 16 MG/DL (ref 6–20)
BUN/CREAT SERPL: 18.4 (ref 7–25)
CALCIUM SPEC-SCNC: 9.4 MG/DL (ref 8.6–10.5)
CHLORIDE SERPL-SCNC: 103 MMOL/L (ref 98–107)
CO2 SERPL-SCNC: 24.3 MMOL/L (ref 22–29)
CREAT SERPL-MCNC: 0.87 MG/DL (ref 0.57–1)
DEPRECATED RDW RBC AUTO: 46.5 FL (ref 37–54)
EGFRCR SERPLBLD CKD-EPI 2021: 76.9 ML/MIN/1.73
EOSINOPHIL # BLD AUTO: 0.18 10*3/MM3 (ref 0–0.4)
EOSINOPHIL NFR BLD AUTO: 2.2 % (ref 0.3–6.2)
ERYTHROCYTE [DISTWIDTH] IN BLOOD BY AUTOMATED COUNT: 13 % (ref 12.3–15.4)
GLOBULIN UR ELPH-MCNC: 2.5 GM/DL
GLUCOSE SERPL-MCNC: 151 MG/DL (ref 65–99)
HCT VFR BLD AUTO: 38.5 % (ref 34–46.6)
HGB BLD-MCNC: 12.4 G/DL (ref 12–15.9)
IMM GRANULOCYTES # BLD AUTO: 0.03 10*3/MM3 (ref 0–0.05)
IMM GRANULOCYTES NFR BLD AUTO: 0.4 % (ref 0–0.5)
LYMPHOCYTES # BLD AUTO: 2.47 10*3/MM3 (ref 0.7–3.1)
LYMPHOCYTES NFR BLD AUTO: 30.2 % (ref 19.6–45.3)
MCH RBC QN AUTO: 31 PG (ref 26.6–33)
MCHC RBC AUTO-ENTMCNC: 32.2 G/DL (ref 31.5–35.7)
MCV RBC AUTO: 96.3 FL (ref 79–97)
MONOCYTES # BLD AUTO: 0.55 10*3/MM3 (ref 0.1–0.9)
MONOCYTES NFR BLD AUTO: 6.7 % (ref 5–12)
NEUTROPHILS NFR BLD AUTO: 4.9 10*3/MM3 (ref 1.7–7)
NEUTROPHILS NFR BLD AUTO: 59.9 % (ref 42.7–76)
NRBC BLD AUTO-RTO: 0 /100 WBC (ref 0–0.2)
PLATELET # BLD AUTO: 263 10*3/MM3 (ref 140–450)
PMV BLD AUTO: 9.7 FL (ref 6–12)
POTASSIUM SERPL-SCNC: 3.7 MMOL/L (ref 3.5–5.2)
PROT SERPL-MCNC: 6.8 G/DL (ref 6–8.5)
RBC # BLD AUTO: 4 10*6/MM3 (ref 3.77–5.28)
SODIUM SERPL-SCNC: 141 MMOL/L (ref 136–145)
TSH SERPL DL<=0.05 MIU/L-ACNC: 3.29 UIU/ML (ref 0.27–4.2)
WBC NRBC COR # BLD AUTO: 8.18 10*3/MM3 (ref 3.4–10.8)

## 2024-04-08 PROCEDURE — 80050 GENERAL HEALTH PANEL: CPT | Performed by: INTERNAL MEDICINE

## 2024-04-08 PROCEDURE — 82306 VITAMIN D 25 HYDROXY: CPT | Performed by: INTERNAL MEDICINE

## 2024-04-08 PROCEDURE — 99214 OFFICE O/P EST MOD 30 MIN: CPT

## 2024-04-08 NOTE — PROGRESS NOTES
NAME: Ubaldo Thomas    : 1965    DATE: 2024    DIAGNOSIS:   1.  Stage IA (pT1bN0(sn)MX) moderately differentiated invasive ductal carcinoma of the L breast.  Tumor was 6 mm in maximal dimension with associated DCIS.  Initial margin positive for DCIS with revision margins now clear.  0/2 SLN involved.  Tumor was 88% 3+ ER+, 86 % 3+ HI+ and HER-2/Frances Negative (1+) with negative FISH testing.  Mammaprint Low risk Luminal-Type A.    2.  Osteoporosis Baseline DEXA 22 with T score -3.1      TREATMENT HISTORY:    1.  L Lumpectomy, SLNBx (Dr. Jackson) 10-17-22  2.  Revision for clearance of margins 10-26-22  3.  Dr. Mccrary delivered accelerated partial breast irradiation between23 and 23  4. Started Tamoxifen daily 23      CHIEF COMPLAINT:  Follow-up of Breast cancer      HISTORY OF PRESENT ILLNESS:   Ubaldo Thomas is a very pleasant 58 y.o. female who was referred by Dr. Elva Jackson for evaluation and treatment of breast cancer.  She was in her usual state of health when she underwent screening mammogram as below which was abnormal.  She had a biopsy on 2022 revealing an invasive mammary carcinoma, grade 2 with associated DCIS.  Tumor was ER/HI positive and HER2/frances negative.  She underwent genetic testing through [facility] which showed no genetic mutations.  On 10/17/2022 Dr. Jackson took her for left lumpectomy and sentinel lymph node biopsy.  Small tumor was resected as below and margins were clear for invasive ductal carcinoma, but she did have involvement of the anterior margin by DCIS.  She underwent revision on 10/26/2022 with clearance of margins.  She is referred here for discussion of further therapy.    Ms. Thomas is overall doing well.  She denies weight loss, chest pain, shortness of breath, abdominal pain, nausea, vomiting, diarrhea or constipation.  She denies rectal bleeding.  She denies bone pain, headaches or visual change.      INTERVAL HISTORY:    Ms. Thomas presents today for follow up of breast cancer.  She has been doing well since her last visit with us.  She continues to take tamoxifen.  Hot flashes continue to be improved.  She continues to see gynecology yearly.  She continues to see dermatology yearly.  She has a bilateral diagnostic mammogram coming up on 2024.  Otherwise no other new or specific complaints today.      PAST MEDICAL HISTORY:  Past Medical History:   Diagnosis Date    Bell's palsy     Breast cancer     left    History of radiation therapy 2023    Left breast APBI    Hx of radiation therapy 02/15/2023    5 treatments, (L) breast    Kidney stones     Skin cancer     Melanoma InSitu     Risk Factors:  Age of Menarche: 13 years old  Age of Menopause: 40s  Prior Breast Disease: She has history of fibrocystic breast disease but no history of abnormal mammograms or biopsies  Pregnancies:    Age of 1st pregnancy: 29 years old  Family History of Breast Cancer: None  Family History of Ovarian Cancer: Maternal grandmother had some type of malignancy in her 60s which was possibly ovarian cancer although this is uncertain  History of HRT use: None    PAST SURGICAL HISTORY:  Past Surgical History:   Procedure Laterality Date    BREAST LUMPECTOMY Left 10/17/2022    KIDNEY STONE SURGERY     - History of removal of melanoma in situ involving right posterior thigh  - 2022 underwent surgery for left facial nerve reanimation for treatment of Bell's palsy.    FAMILY HISTORY:  Family History   Problem Relation Age of Onset    Diabetes Mother     Heart disease Father     Breast cancer Neg Hx        SOCIAL HISTORY:  Social History     Socioeconomic History    Marital status:    Tobacco Use    Smoking status: Never    Smokeless tobacco: Never   Vaping Use    Vaping status: Never Used   Substance and Sexual Activity    Alcohol use: Yes     Comment: occasionally    Drug use: Never    Sexual activity: Defer         REVIEW OF  "SYSTEMS:   A comprehensive 14 point review of systems was performed.  Significant findings as mentioned above.  All other systems reviewed and are negative.      MEDICATIONS:  The current medication list was reviewed in the EMR    Current Outpatient Medications:     atorvastatin (LIPITOR) 20 MG tablet, , Disp: , Rfl:     Calcium Carbonate-Vitamin D 600-10 MG-MCG per tablet, Take 1 tablet by mouth 2 (Two) Times a Day. (Patient not taking: Reported on 1/2/2024), Disp: 60 tablet, Rfl: 5    clonazePAM (KlonoPIN) 0.5 MG tablet, , Disp: , Rfl:     escitalopram (LEXAPRO) 10 MG tablet, Take 1 tablet by mouth Daily., Disp: 90 tablet, Rfl: 3    tamoxifen (NOLVADEX) 20 MG chemo tablet, Take 1 tablet by mouth Daily., Disp: 90 tablet, Rfl: 3    vitamin D (ERGOCALCIFEROL) 1.25 MG (80471 UT) capsule capsule, Take 1 capsule by mouth 1 (One) Time Per Week., Disp: 4 capsule, Rfl: 3    ALLERGIES:    Allergies   Allergen Reactions    Dexamethasone Arrhythmia       PHYSICAL EXAM:  Vitals:    04/08/24 1437   BP: 119/76   Pulse: 92   Resp: 20   Temp: 98 °F (36.7 °C)   TempSrc: Temporal   SpO2: 96%   Weight: 72.4 kg (159 lb 9.6 oz)   Height: 160 cm (63\")   PainSc: 0-No pain     Body surface area is 1.76 meters squared.   Body mass index is 28.27 kg/m².     General:  Awake, alert and oriented, appears well today. In no acute distress.  HEENT:  Pupils are equal, round and reactive to light and accommodation, Extra-ocular movements full, Oropharyx clear, mucous membranes moist.  She has asymmetry of the face consistent with left sided Bell's palsy but effect is lessened s/p Botox treatment.  Neck:  No JVD, thyromegaly or lymphadenopathy  CV:  Regular rate and rhythm, no murmurs, rubs or gallops  Resp:  Lungs are clear to auscultation bilaterally no wheezing  Breast: Status post left lumpectomy and revision as well as left sentinel lymph node biopsy.  She has some firmer tissue around the sites of her surgeries as well as what appears to be an " old biopsy site.  No palpable mass lesions.  No left axillary adenopathy.  Right breast without mass lesions.  Right axilla without adenopathy.  Abd:  Soft, non-tender, non-distended, bowel sounds present, no organomegaly or masses  Ext:  No clubbing, cyanosis, or lower extremity edema.  Lymph:  No cervical, supraclavicular, axillary, inguinal or femoral adenopathy  Neuro:  MS as above, CN II-XII intact, grossly non-focal exam      PATHOLOGY:    09/28/2022            FISH Analysis 09/28/2022      10/17/2022      10/26/2022      Genetic Testing 11/07/2022    Mammaprint 11/28/22          ENDOSCOPY:    Colonoscopy 10/16/17          IMAGING:    Bilateral Diagnostic Mammogram with Bilateral Breast Ultrasound ([facility]) 09/27/2022        Bilateral Breast MRI ([facility]) 09/30/2022      Mammoprint Results 11/28/22      DEXA Bone Density Axial (12/08/2022 14:11)  FINDINGS: The BMD measured at the AP spine L1-L4 is 0.808 gm/cm2 with a  T-score of -3.1.      IMPRESSION:  The patient is considered to be osteoporotic according to the  World Health Organization criteria. Fracture risk is high.     Mammo Diagnostic Digital Tomosynthesis Bilateral With CAD (05/25/2023 12:44)   FINDINGS: The breasts are heterogeneously dense, which may obscure small  masses.     Presumed postoperative changes are now noted in the left 12:00 region  from the patient's interval conservation surgery. Mild skin thickening  is also noted in the left breast which is likely treatment related.  There are a few bilateral scattered benign-appearing calcifications. No  dominant mass, suspicious calcifications, or nonsurgical areas of  architectural distortion are seen in either breast.     IMPRESSION:  Incomplete examination as comparison with prior imaging of  the right breast is needed.        BI-RADS CATEGORY:   0, INCOMPLETE:  Need prior mammograms for comparison        RECOMMENDATION:  We will obtain the patient's prior outside mammographic  imaging for  comparison. Also recommend 6 month follow-up diagnostic left  mammogram.    Addendum  Outside mammographic images dated 08/31/2022, 08/05/2022, and 07/23/2021  were compared to the exam performed at our facility on 05/25/2023. The  fibroglandular pattern of the right breast is stable in appearance.     FINAL ACR BI-RADS CATEGORY: 3, PROBABLY BENIGN     RECOMMENDATION: Recommend 6 month follow-up diagnostic left mammogram.     Mammo Diagnostic Digital Tomosynthesis Left With CAD (11/28/2023 10:22)   FINDINGS: The left breast heterogeneously dense, which may obscure small  masses.     Postoperative changes being followed in the left 12:00 region are stable  in appearance as is the remaining fibroglandular pattern. There are  stable scattered calcifications. Mild skin thickening is again noted and  likely treatment related. No new or suspicious findings are identified.     IMPRESSION:  Probably benign left mammographic findings. Recommend  continued follow-up.        BI-RADS CATEGORY: 3, PROBABLY BENIGN    RECENT LABS:  Lab Results   Component Value Date    WBC 8.18 04/08/2024    HGB 12.4 04/08/2024    HCT 38.5 04/08/2024    MCV 96.3 04/08/2024    RDW 13.0 04/08/2024     04/08/2024    NEUTRORELPCT 59.9 04/08/2024    LYMPHORELPCT 30.2 04/08/2024    MONORELPCT 6.7 04/08/2024    EOSRELPCT 2.2 04/08/2024    BASORELPCT 0.6 04/08/2024    NEUTROABS 4.90 04/08/2024    LYMPHSABS 2.47 04/08/2024       Lab Results   Component Value Date     02/26/2024    K 3.9 02/26/2024    CO2 24.1 02/26/2024     02/26/2024    BUN 13 02/26/2024    CREATININE 0.70 02/26/2024    GLUCOSE 134 (H) 02/26/2024    CALCIUM 9.3 02/26/2024    ALKPHOS 50 02/26/2024    AST 20 02/26/2024    ALT 8 02/26/2024    BILITOT 0.4 02/26/2024    ALBUMIN 4.2 02/26/2024    PROTEINTOT 6.6 02/26/2024    MG 2.3 02/26/2024    PHOS 4.0 02/26/2024     Lab Results   Component Value Date    FERRITIN 129.40 11/15/2022    IRON 57 11/15/2022    TIBC 350  11/15/2022    LABIRON 16 (L) 11/15/2022    [id number] 594 11/15/2022    FOLATE 9.82 11/15/2022     Lab Results   Component Value Date    TSH 2.380 01/02/2024    KVXT39BS 57.3 01/02/2024     Lab Results   Component Value Date     26.2 (H) 11/15/2022     Lab Results   Component Value Date    LABCA2 31.1 11/15/2022           ASSESSMENT & PLAN:  Ubaldo Thomas is a very pleasant 58 y.o. female with recently diagnosed Stage IA (pT1bN0(sn)MX) moderately differentiated invasive ductal carcinoma of the L breast.  Tumor was 6 mm in maximal dimension with associated DCIS.  Initial margin positive for DCIS with revision margins now clear.  0/2 SLN involved.  Tumor was 88% 3+ ER+, 86 % 3+ NE+ and HER-2/Frances Negative (1+) with negative FISH testing.  Mammaprint Low Risk Luminal-Type A.    1.  Left Breast Cancer:  - S/p L Lumpectomy 10-17-22 with SLNBx with revision of margin on 10-26-22 for involvement with DCIS.  Surgical margins clear.    - Overall her tumor has favorable characteristics including strong hormonal positivity.  - Given tumor size just >5 mm, recommended we send genomic profiling to determine whether risk is sufficient to necessitate treatment with chemotherapy. Happily, testing returned as low risk so chemotherapy was not recommended.  - Returned to Dr. Claudia Mccrary for accelerated partial breast irradiation which she completed on 1/27/2023.  She did very well with her treatment.  - She then required hormonal therapy given strongly hormonally driven cancer.  She is postmenopausal and has an intact uterus.  She had pre-existing osteoporosis with a T score of -3.1 at baseline.  Discussed the reason for hormonal therapy, and different options including treatment with an aromatase inhibitor versus tamoxifen.  Discussed the relative risks benefits and side effects of each, and after conversation, she decided to proceed with tamoxifen therapy.  She does understand that this carries with it a small but  increased risk of development of endometrial cancer and she knows she will need to see her gynecologist once a year. Gynecological exam in June 2023 was normal.   - Exam and labs from today without cause for concern.  - Will continue Tamoxifen daily which she is tolerating well.  - Bilateral diagnostic mammogram  5/25/23 without cause for concern w/ L diagnostic mammogram recommended after 11/25/23. This was performed 11/28/23 and was without concerning findings.  Oj diagnostic mammogram will be due p 5/25/24.  This is scheduled for 5/28/2024.   - She will follow up with MD in 3 months with CBCD, CMP, TSH and Vitamin D.     2.  Osteoporosis:  - Baseline DEXA scan performed 12/8/2022 showed osteoporosis with a T score of -3.1.  Recommended treatment with Prolia.  She is taking calcium with vitamin D twice daily.  She finds it hard to swallow the large pills so recommended OTC gummy supplement instead.   - Continue weekly high dose Vit D.  - Vitamin D level pending from today.   - Repeat DEXA scan will be due p 12/8/24.    3.  Anxiety / Depression:  - Doing much better on Lexapro 10 mg daily.  Will continue.     4.   H/o melanoma in situ RLE many years ago:  -  Continues to f/u with dermatology yearly.  Reports had exam there ([facility] in [place]) in May 2023 with full skin exam and no concerning findings.    5.  Prophylaxis:  -  Ubaldo Thomas received 2023 flu vaccine.  -  Ubaldo Thomas received 4 doses COVID vaccine. and received bivalent booster.  She also received 2023 Fall Booster.  -  Ubaldo Thomas has had colonoscopy in 2018 performed by Dr. Montgomery. She says exam was normal without polyps. Repeat exam will be due in 2028.    8. Follow Up:  - Continue Tamoxifen daily.  - Continue Prolia q6 months with Ca/Vit D and weekly Vit D.   - Continue Lexapro 10 mg daily.    - Oj diagnostic mammogram 5/28/2024  - Yearly follow up with dermatology and gynecology.   - F/u with  Dr. Porras in 3 months with CBCD, CMP, TSH and Vitamin D prior.    9. ACO / KIARA/Other  Quality measures  -  Ubaldo Thomas received 2023 flu vaccine.  -  Ubaldo Thomas reports a pain score of 0.    -  Current outpatient and discharge medications have been reconciled for the patient.  Reviewed by: CHELE Ambriz      I spent 30 minutes with Ubaldo Thomas today.  In the office today, more than 50% of this time was spent face-to-face with her  in counseling / coordination of care, reviewing her interim medical history and counseling on the current treatment plan.  All questions were answered to her satisfaction.         Electronically Signed by:CHELE Ambriz

## 2024-04-08 NOTE — PROGRESS NOTES
Venipuncture Blood Specimen Collection  Venipuncture performed in left arm by Marleny Dias MA with good hemostasis. Patient tolerated the procedure well without complications.   04/08/24   Marleny Dias MA

## 2024-04-09 LAB — 25(OH)D3 SERPL-MCNC: 57.3 NG/ML (ref 30–100)

## 2024-05-28 ENCOUNTER — HOSPITAL ENCOUNTER (OUTPATIENT)
Dept: MAMMOGRAPHY | Facility: HOSPITAL | Age: 59
Discharge: HOME | End: 2024-05-28
Admitting: INTERNAL MEDICINE
Payer: COMMERCIAL

## 2024-05-28 DIAGNOSIS — C50.912 MALIGNANT NEOPLASM OF LEFT BREAST IN FEMALE, ESTROGEN RECEPTOR POSITIVE, UNSPECIFIED SITE OF BREAST: ICD-10-CM

## 2024-05-28 DIAGNOSIS — C50.212 MALIGNANT NEOPLASM OF UPPER-INNER QUADRANT OF LEFT BREAST IN FEMALE, ESTROGEN RECEPTOR POSITIVE: ICD-10-CM

## 2024-05-28 DIAGNOSIS — Z17.0 MALIGNANT NEOPLASM OF LEFT BREAST IN FEMALE, ESTROGEN RECEPTOR POSITIVE, UNSPECIFIED SITE OF BREAST: ICD-10-CM

## 2024-05-28 DIAGNOSIS — D03.71 MELANOMA IN SITU OF RIGHT LOWER EXTREMITY INCLUDING HIP: ICD-10-CM

## 2024-05-28 DIAGNOSIS — Z17.0 MALIGNANT NEOPLASM OF UPPER-INNER QUADRANT OF LEFT BREAST IN FEMALE, ESTROGEN RECEPTOR POSITIVE: ICD-10-CM

## 2024-05-28 PROCEDURE — G0279 TOMOSYNTHESIS, MAMMO: HCPCS

## 2024-05-28 PROCEDURE — 77066 DX MAMMO INCL CAD BI: CPT

## 2024-06-26 ENCOUNTER — LAB (OUTPATIENT)
Dept: ONCOLOGY | Facility: CLINIC | Age: 59
End: 2024-06-26
Payer: COMMERCIAL

## 2024-06-26 ENCOUNTER — OFFICE VISIT (OUTPATIENT)
Dept: ONCOLOGY | Facility: CLINIC | Age: 59
End: 2024-06-26
Payer: COMMERCIAL

## 2024-06-26 VITALS
HEART RATE: 71 BPM | TEMPERATURE: 97.1 F | OXYGEN SATURATION: 98 % | SYSTOLIC BLOOD PRESSURE: 139 MMHG | WEIGHT: 152.2 LBS | BODY MASS INDEX: 26.96 KG/M2 | DIASTOLIC BLOOD PRESSURE: 84 MMHG | RESPIRATION RATE: 18 BRPM

## 2024-06-26 DIAGNOSIS — R53.83 FATIGUE, UNSPECIFIED TYPE: ICD-10-CM

## 2024-06-26 DIAGNOSIS — C50.912 MALIGNANT NEOPLASM OF LEFT BREAST IN FEMALE, ESTROGEN RECEPTOR POSITIVE, UNSPECIFIED SITE OF BREAST: Primary | ICD-10-CM

## 2024-06-26 DIAGNOSIS — Z78.0 POSTMENOPAUSAL: ICD-10-CM

## 2024-06-26 DIAGNOSIS — E55.9 VITAMIN D DEFICIENCY: ICD-10-CM

## 2024-06-26 DIAGNOSIS — Z17.0 MALIGNANT NEOPLASM OF UPPER-INNER QUADRANT OF LEFT BREAST IN FEMALE, ESTROGEN RECEPTOR POSITIVE: ICD-10-CM

## 2024-06-26 DIAGNOSIS — M81.0 AGE-RELATED OSTEOPOROSIS WITHOUT CURRENT PATHOLOGICAL FRACTURE: ICD-10-CM

## 2024-06-26 DIAGNOSIS — Z17.0 MALIGNANT NEOPLASM OF LEFT BREAST IN FEMALE, ESTROGEN RECEPTOR POSITIVE, UNSPECIFIED SITE OF BREAST: ICD-10-CM

## 2024-06-26 DIAGNOSIS — D03.71 MELANOMA IN SITU OF RIGHT LOWER EXTREMITY INCLUDING HIP: ICD-10-CM

## 2024-06-26 DIAGNOSIS — F32.A DEPRESSION, UNSPECIFIED DEPRESSION TYPE: ICD-10-CM

## 2024-06-26 DIAGNOSIS — C50.212 MALIGNANT NEOPLASM OF UPPER-INNER QUADRANT OF LEFT BREAST IN FEMALE, ESTROGEN RECEPTOR POSITIVE: ICD-10-CM

## 2024-06-26 DIAGNOSIS — Z17.0 MALIGNANT NEOPLASM OF LEFT BREAST IN FEMALE, ESTROGEN RECEPTOR POSITIVE, UNSPECIFIED SITE OF BREAST: Primary | ICD-10-CM

## 2024-06-26 DIAGNOSIS — C50.912 MALIGNANT NEOPLASM OF LEFT BREAST IN FEMALE, ESTROGEN RECEPTOR POSITIVE, UNSPECIFIED SITE OF BREAST: ICD-10-CM

## 2024-06-26 LAB
25(OH)D3 SERPL-MCNC: 59.2 NG/ML (ref 30–100)
ALBUMIN SERPL-MCNC: 4.1 G/DL (ref 3.5–5.2)
ALBUMIN/GLOB SERPL: 1.7 G/DL
ALP SERPL-CCNC: 49 U/L (ref 39–117)
ALT SERPL W P-5'-P-CCNC: 17 U/L (ref 1–33)
ANION GAP SERPL CALCULATED.3IONS-SCNC: 8.7 MMOL/L (ref 5–15)
AST SERPL-CCNC: 23 U/L (ref 1–32)
BASOPHILS # BLD AUTO: 0.05 10*3/MM3 (ref 0–0.2)
BASOPHILS NFR BLD AUTO: 0.8 % (ref 0–1.5)
BILIRUB SERPL-MCNC: 0.7 MG/DL (ref 0–1.2)
BUN SERPL-MCNC: 14 MG/DL (ref 6–20)
BUN/CREAT SERPL: 18.9 (ref 7–25)
CALCIUM SPEC-SCNC: 9.3 MG/DL (ref 8.6–10.5)
CHLORIDE SERPL-SCNC: 106 MMOL/L (ref 98–107)
CO2 SERPL-SCNC: 26.3 MMOL/L (ref 22–29)
CREAT SERPL-MCNC: 0.74 MG/DL (ref 0.57–1)
DEPRECATED RDW RBC AUTO: 46.3 FL (ref 37–54)
EGFRCR SERPLBLD CKD-EPI 2021: 93.3 ML/MIN/1.73
EOSINOPHIL # BLD AUTO: 0.16 10*3/MM3 (ref 0–0.4)
EOSINOPHIL NFR BLD AUTO: 2.6 % (ref 0.3–6.2)
ERYTHROCYTE [DISTWIDTH] IN BLOOD BY AUTOMATED COUNT: 12.8 % (ref 12.3–15.4)
GLOBULIN UR ELPH-MCNC: 2.4 GM/DL
GLUCOSE SERPL-MCNC: 107 MG/DL (ref 65–99)
HCT VFR BLD AUTO: 42.8 % (ref 34–46.6)
HGB BLD-MCNC: 13.8 G/DL (ref 12–15.9)
IMM GRANULOCYTES # BLD AUTO: 0.01 10*3/MM3 (ref 0–0.05)
IMM GRANULOCYTES NFR BLD AUTO: 0.2 % (ref 0–0.5)
LYMPHOCYTES # BLD AUTO: 1.68 10*3/MM3 (ref 0.7–3.1)
LYMPHOCYTES NFR BLD AUTO: 27.8 % (ref 19.6–45.3)
MCH RBC QN AUTO: 31.3 PG (ref 26.6–33)
MCHC RBC AUTO-ENTMCNC: 32.2 G/DL (ref 31.5–35.7)
MCV RBC AUTO: 97.1 FL (ref 79–97)
MONOCYTES # BLD AUTO: 0.57 10*3/MM3 (ref 0.1–0.9)
MONOCYTES NFR BLD AUTO: 9.4 % (ref 5–12)
NEUTROPHILS NFR BLD AUTO: 3.58 10*3/MM3 (ref 1.7–7)
NEUTROPHILS NFR BLD AUTO: 59.2 % (ref 42.7–76)
NRBC BLD AUTO-RTO: 0 /100 WBC (ref 0–0.2)
PLATELET # BLD AUTO: 240 10*3/MM3 (ref 140–450)
PMV BLD AUTO: 10 FL (ref 6–12)
POTASSIUM SERPL-SCNC: 4.7 MMOL/L (ref 3.5–5.2)
PROT SERPL-MCNC: 6.5 G/DL (ref 6–8.5)
RBC # BLD AUTO: 4.41 10*6/MM3 (ref 3.77–5.28)
SODIUM SERPL-SCNC: 141 MMOL/L (ref 136–145)
TSH SERPL DL<=0.05 MIU/L-ACNC: 3.48 UIU/ML (ref 0.27–4.2)
WBC NRBC COR # BLD AUTO: 6.05 10*3/MM3 (ref 3.4–10.8)

## 2024-06-26 PROCEDURE — 82306 VITAMIN D 25 HYDROXY: CPT | Performed by: INTERNAL MEDICINE

## 2024-06-26 PROCEDURE — 99214 OFFICE O/P EST MOD 30 MIN: CPT | Performed by: INTERNAL MEDICINE

## 2024-06-26 PROCEDURE — 80050 GENERAL HEALTH PANEL: CPT | Performed by: INTERNAL MEDICINE

## 2024-06-26 RX ORDER — TAMOXIFEN CITRATE 20 MG/1
20 TABLET ORAL DAILY
Qty: 90 TABLET | Refills: 3 | Status: SHIPPED | OUTPATIENT
Start: 2024-06-26

## 2024-06-26 RX ORDER — ERGOCALCIFEROL 1.25 MG/1
50000 CAPSULE ORAL WEEKLY
Qty: 4 CAPSULE | Refills: 3 | Status: SHIPPED | OUTPATIENT
Start: 2024-06-26

## 2024-06-26 RX ORDER — ESCITALOPRAM OXALATE 10 MG/1
10 TABLET ORAL DAILY
Qty: 90 TABLET | Refills: 3 | Status: SHIPPED | OUTPATIENT
Start: 2024-06-26

## 2024-06-26 RX ORDER — MELOXICAM 15 MG/1
15 TABLET ORAL DAILY
COMMUNITY

## 2024-06-26 NOTE — PROGRESS NOTES
NAME: Ubaldo Thomas    : 1965    DATE: 2024    DIAGNOSIS:   1.  Stage IA (pT1bN0(sn)MX) moderately differentiated invasive ductal carcinoma of the L breast.  Tumor was 6 mm in maximal dimension with associated DCIS.  Initial margin positive for DCIS with revision margins now clear.  0/2 SLN involved.  Tumor was 88% 3+ ER+, 86 % 3+ WA+ and HER-2/Frances Negative (1+) with negative FISH testing.  Mammaprint Low risk Luminal-Type A.    2.  Osteoporosis Baseline DEXA 22 with T score -3.1      TREATMENT HISTORY:    1.  L Lumpectomy, SLNBx (Dr. [place]) 10-17-22  2.  Revision for clearance of margins 10-26-22  3.  Dr. Mccrary delivered accelerated partial breast irradiation between23 and 23  4. Started Tamoxifen daily 23      CHIEF COMPLAINT:  Follow-up of Breast cancer      HISTORY OF PRESENT ILLNESS:   Ubaldo Thomas is a very pleasant 58 y.o. female who was referred by Dr. Elva [place] for evaluation and treatment of breast cancer.  She was in her usual state of health when she underwent screening mammogram as below which was abnormal.  She had a biopsy on 2022 revealing an invasive mammary carcinoma, grade 2 with associated DCIS.  Tumor was ER/WA positive and HER2/frances negative.  She underwent genetic testing through [facility] which showed no genetic mutations.  On 10/17/2022 Dr. [place] took her for left lumpectomy and sentinel lymph node biopsy.  Small tumor was resected as below and margins were clear for invasive ductal carcinoma, but she did have involvement of the anterior margin by DCIS.  She underwent revision on 10/26/2022 with clearance of margins.  She is referred here for discussion of further therapy.    Ms. Thomas is overall doing well.  She denies weight loss, chest pain, shortness of breath, abdominal pain, nausea, vomiting, diarrhea or constipation.  She denies rectal bleeding.  She denies bone pain, headaches or visual change.      INTERVAL HISTORY:    Ms. Thomas presents today for follow up of breast cancer.  Since her last visit, she has overall been well.  She continues to tolerate tamoxifen well, and continues on calcium supplements as well as weekly vitamin D.  She also continues on Lexapro which continues to work well for her.  She had bilateral diagnostic mammogram on 2024 which showed no cause for concern.  She plans to return to [place] ([facility]) for a surgery on the muscle of her left face to help with facial asymmetry following Bell's palsy.    PAST MEDICAL HISTORY:  Past Medical History:   Diagnosis Date    Bell's palsy     Breast cancer     left    History of radiation therapy 2023    Left breast APBI    Hx of radiation therapy 02/15/2023    5 treatments, (L) breast    Kidney stones     Skin cancer     Melanoma InSitu     Risk Factors:  Age of Menarche: 13 years old  Age of Menopause: 40s  Prior Breast Disease: She has history of fibrocystic breast disease but no history of abnormal mammograms or biopsies  Pregnancies:    Age of 1st pregnancy: 29 years old  Family History of Breast Cancer: None  Family History of Ovarian Cancer: Maternal grandmother had some type of malignancy in her 60s which was possibly ovarian cancer although this is uncertain  History of HRT use: None    PAST SURGICAL HISTORY:  Past Surgical History:   Procedure Laterality Date    BREAST LUMPECTOMY Left 10/17/2022    KIDNEY STONE SURGERY     - History of removal of melanoma in situ involving right posterior thigh  - 2022 underwent surgery for left facial nerve reanimation for treatment of Bell's palsy.    FAMILY HISTORY:  Family History   Problem Relation Age of Onset    Diabetes Mother     Heart disease Father     Breast cancer Neg Hx        SOCIAL HISTORY:  Social History     Socioeconomic History    Marital status:    Tobacco Use    Smoking status: Never    Smokeless tobacco: Never   Vaping Use    Vaping status: Never Used   Substance and  Sexual Activity    Alcohol use: Yes     Comment: occasionally    Drug use: Never    Sexual activity: Defer         REVIEW OF SYSTEMS:   A comprehensive 14 point review of systems was performed.  Significant findings as mentioned above.  All other systems reviewed and are negative.      MEDICATIONS:  The current medication list was reviewed in the EMR    Current Outpatient Medications:     atorvastatin (LIPITOR) 20 MG tablet, , Disp: , Rfl:     Calcium Carbonate-Vitamin D 600-10 MG-MCG per tablet, Take 1 tablet by mouth 2 (Two) Times a Day., Disp: 60 tablet, Rfl: 5    escitalopram (LEXAPRO) 10 MG tablet, Take 1 tablet by mouth Daily., Disp: 90 tablet, Rfl: 3    meloxicam (MOBIC) 15 MG tablet, Take 1 tablet by mouth Daily., Disp: , Rfl:     tamoxifen (NOLVADEX) 20 MG chemo tablet, Take 1 tablet by mouth Daily., Disp: 90 tablet, Rfl: 3    vitamin D (ERGOCALCIFEROL) 1.25 MG (50199 UT) capsule capsule, Take 1 capsule by mouth 1 (One) Time Per Week., Disp: 4 capsule, Rfl: 3    clonazePAM (KlonoPIN) 0.5 MG tablet, , Disp: , Rfl:     ALLERGIES:    Allergies   Allergen Reactions    Dexamethasone Arrhythmia       PHYSICAL EXAM:  Vitals:    06/26/24 0922   BP: 139/84   Pulse: 71   Resp: 18   Temp: 97.1 °F (36.2 °C)   TempSrc: Temporal   SpO2: 98%   Weight: 69 kg (152 lb 3.2 oz)   PainSc: 0-No pain       Body surface area is 1.72 meters squared.   Body mass index is 26.96 kg/m².     General:  Awake, alert and oriented, appears well today. In no acute distress.  HEENT:  Pupils are equal, round and reactive to light and accommodation, Extra-ocular movements full, Oropharyx clear, mucous membranes moist.  She has asymmetry of the face consistent with left sided Bell's palsy.  Neck:  No JVD, thyromegaly or lymphadenopathy  CV:  Regular rate and rhythm, no murmurs, rubs or gallops  Resp:  Lungs are clear to auscultation bilaterally no wheezing  Breast: Status post left lumpectomy and revision as well as left sentinel lymph node  biopsy.  She has some firmer tissue around the sites of her surgeries as well as what appears to be an old biopsy site.  No palpable mass lesions.  No left axillary adenopathy.  Right breast without mass lesions.  Right axilla without adenopathy.  Abd:  Soft, non-tender, non-distended, bowel sounds present, no palpable hepatosplenomegaly or mass lesions  Ext:  No clubbing, cyanosis, no lower extremity edema  Lymph:  No cervical, supraclavicular, axillary, inguinal or femoral adenopathy  Neuro:  MS as above, CN II-XII intact, grossly non-focal exam      PATHOLOGY:    09/28/2022            FISH Analysis 09/28/2022      10/17/2022      10/26/2022      Genetic Testing 11/07/2022    Mammaprint 11/28/22          ENDOSCOPY:    Colonoscopy 10/16/17          IMAGING:    Bilateral Diagnostic Mammogram with Bilateral Breast Ultrasound ([facility]) 09/27/2022        Bilateral Breast MRI ([facility]) 09/30/2022      Mammoprint Results 11/28/22      DEXA Bone Density Axial (12/08/2022 14:11)  FINDINGS: The BMD measured at the AP spine L1-L4 is 0.808 gm/cm2 with a  T-score of -3.1.      IMPRESSION:  The patient is considered to be osteoporotic according to the  World Health Organization criteria. Fracture risk is high.     Mammo Diagnostic Digital Tomosynthesis Bilateral With CAD (05/25/2023 12:44)   FINDINGS: The breasts are heterogeneously dense, which may obscure small  masses.     Presumed postoperative changes are now noted in the left 12:00 region  from the patient's interval conservation surgery. Mild skin thickening  is also noted in the left breast which is likely treatment related.  There are a few bilateral scattered benign-appearing calcifications. No  dominant mass, suspicious calcifications, or nonsurgical areas of  architectural distortion are seen in either breast.     IMPRESSION:  Incomplete examination as comparison with prior imaging of  the right breast is needed.        BI-RADS CATEGORY:   0, INCOMPLETE:  Need prior  mammograms for comparison        RECOMMENDATION:  We will obtain the patient's prior outside mammographic  imaging for comparison. Also recommend 6 month follow-up diagnostic left  mammogram.    Addendum  Outside mammographic images dated 08/31/2022, 08/05/2022, and 07/23/2021  were compared to the exam performed at our facility on 05/25/2023. The  fibroglandular pattern of the right breast is stable in appearance.     FINAL ACR BI-RADS CATEGORY: 3, PROBABLY BENIGN     RECOMMENDATION: Recommend 6 month follow-up diagnostic left mammogram.     Mammo Diagnostic Digital Tomosynthesis Left With CAD (11/28/2023 10:22)   FINDINGS: The left breast heterogeneously dense, which may obscure small  masses.     Postoperative changes being followed in the left 12:00 region are stable  in appearance as is the remaining fibroglandular pattern. There are  stable scattered calcifications. Mild skin thickening is again noted and  likely treatment related. No new or suspicious findings are identified.     IMPRESSION:  Probably benign left mammographic findings. Recommend  continued follow-up.        BI-RADS CATEGORY: 3, PROBABLY BENIGN    Mammo Diagnostic Digital Tomosynthesis Bilateral With CAD (05/28/2024 10:05)   FINDINGS: There are scattered areas of fibroglandular density.     The bilateral fibroglandular pattern is stable in appearance including  postoperative changes being followed in the superior aspect of the left  breast. There are stable scattered calcifications. Mild skin thickening  is again noted involving the left breast which is likely treatment  related. No new or suspicious findings are identified in either breast.     IMPRESSION:     1. Benign right mammographic findings.     2. Probably benign left mammographic findings. Recommend continued  follow-up.     BI-RADS CATEGORY: 3, PROBABLY BENIGN     RECOMMENDATION: Recommend additional 6-month follow-up diagnostic left  mammogram.    RECENT LABS:  Lab Results    Component Value Date    WBC 6.05 06/26/2024    HGB 13.8 06/26/2024    HCT 42.8 06/26/2024    MCV 97.1 (H) 06/26/2024    RDW 12.8 06/26/2024     06/26/2024    NEUTRORELPCT 59.2 06/26/2024    LYMPHORELPCT 27.8 06/26/2024    MONORELPCT 9.4 06/26/2024    EOSRELPCT 2.6 06/26/2024    BASORELPCT 0.8 06/26/2024    NEUTROABS 3.58 06/26/2024    LYMPHSABS 1.68 06/26/2024       Lab Results   Component Value Date     06/26/2024    K 4.7 06/26/2024    CO2 26.3 06/26/2024     06/26/2024    BUN 14 06/26/2024    CREATININE 0.74 06/26/2024    GLUCOSE 107 (H) 06/26/2024    CALCIUM 9.3 06/26/2024    ALKPHOS 49 06/26/2024    AST 23 06/26/2024    ALT 17 06/26/2024    BILITOT 0.7 06/26/2024    ALBUMIN 4.1 06/26/2024    PROTEINTOT 6.5 06/26/2024    MG 2.3 02/26/2024    PHOS 4.0 02/26/2024     Lab Results   Component Value Date    FERRITIN 129.40 11/15/2022    IRON 57 11/15/2022    TIBC 350 11/15/2022    LABIRON 16 (L) 11/15/2022    [id number] 594 11/15/2022    FOLATE 9.82 11/15/2022     Lab Results   Component Value Date    TSH 3.480 06/26/2024    SRJK91YR 57.3 04/08/2024     Lab Results   Component Value Date     26.2 (H) 11/15/2022     Lab Results   Component Value Date    LABCA2 31.1 11/15/2022           ASSESSMENT & PLAN:  Ubaldo Thomas is a very pleasant 58 y.o. female with recently diagnosed Stage IA (pT1bN0(sn)MX) moderately differentiated invasive ductal carcinoma of the L breast.  Tumor was 6 mm in maximal dimension with associated DCIS.  Initial margin positive for DCIS with revision margins now clear.  0/2 SLN involved.  Tumor was 88% 3+ ER+, 86 % 3+ MS+ and HER-2/Frances Negative (1+) with negative FISH testing.  Mammaprint Low Risk Luminal-Type A.    1.  Left Breast Cancer:  - S/p L Lumpectomy 10-17-22 with SLNBx with revision of margin on 10-26-22 for involvement with DCIS.  Surgical margins clear.    - Overall her tumor has favorable characteristics including strong hormonal positivity.  - Given  tumor size just >5 mm, recommended we send genomic profiling to determine whether risk is sufficient to necessitate treatment with chemotherapy. Happily, testing returned as low risk so chemotherapy was not recommended.  - Returned to Dr. Claudia Mccrary for accelerated partial breast irradiation which she completed on 1/27/2023.  She did very well with her treatment.  - She then required hormonal therapy given strongly hormonally driven cancer.  She is postmenopausal and has an intact uterus.  She had pre-existing osteoporosis with a T score of -3.1 at baseline.  Discussed the reason for hormonal therapy, and different options including treatment with an aromatase inhibitor versus tamoxifen.  Discussed the relative risks benefits and side effects of each, and after conversation, she decided to proceed with tamoxifen therapy.  She does understand that this carries with it a small but increased risk of development of endometrial cancer and she knows she will need to see her gynecologist once a year. Gynecological exam in June 2023 was normal.  She has an upcoming appointment in July.  - Exam and labs from today without cause for concern.  - Will continue Tamoxifen daily which she is tolerating well.  - Bilateral diagnostic mammogram 5/28/2024 without cause for concern w/ L diagnostic mammogram recommended after 11/20/2024.  Will order this today.  - Will have her follow-up with APRN in 3 months for exam with lab to include CBC, CMP, TSH and vitamin D.  - I will plan to see her back in January following mammogram and repeat DEXA scan with CBC, CMP, TSH and vitamin D.  Medications refilled today.  .     2.  Osteoporosis:  - Baseline DEXA scan performed 12/8/2022 showed osteoporosis with a T score of -3.1.  Recommended treatment with Prolia.  She is taking calcium with vitamin D twice daily.  She finds it hard to swallow the large pills so recommended OTC gummy supplement instead.   - Continue weekly high dose Vit D.  -  Vitamin D level pending from today.   - Repeat DEXA scan will be due p 12/8/24.  Will order this today.  - He will return to see me in January after this is done.    3.  Anxiety / Depression:  - Doing much better on Lexapro 10 mg daily.  Will continue.  Refill today.    4.   H/o melanoma in situ RLE many years ago:  -  Continues to f/u with dermatology yearly.  Reports had exam there ([facility] in [place]) in May 2024 with full skin exam and no concerning findings.    5.  Prophylaxis:  -  Ubaldo Thomas received 2023 flu vaccine.  -  Ubaldo Thomas received 4 doses COVID vaccine. and received bivalent booster.  She also received 2023 Fall Booster.  -  Ubaldo Thomas has had colonoscopy in 2018 performed by Dr. Montgomery. She says exam was normal without polyps. Repeat exam will be due in 2028.    8. Follow Up:  - Continue Tamoxifen daily.  - Continue Prolia q6 months with Ca/Vit D and weekly Vit D.   - Continue Lexapro 10 mg daily.    -Left diagnostic mammogram after 11/28/2024.  - DEXA scan after 12/28/2024.  - Return to see APRN for exam and lab to include CBC, CMP, TSH and vitamin D.  - Return to see me in January after DEXA scan and mammogram with CBC, CMP, TSH and vitamin D.  - Appointment with gynecology as planned in July.  - Continue to follow-up with dermatology at least once a year.  - She plans to return to [facility] in [place] for further surgery on her left face.      9. ACO / KIARA/Other  Quality measures  -  Ubaldo Thomas received 2023 flu vaccine.  -  Ubaldo Thomas reports a pain score of 0.    -  Current outpatient and discharge medications have been reconciled for the patient.  Reviewed by: Nidhi Porras MD      I spent 30 minutes with Ubaldo Thomas today.  In the office today, more than 50% of this time was spent face-to-face with her  in counseling / coordination of care, reviewing her interim medical history and counseling on  the current treatment plan.  All questions were answered to her satisfaction.         Electronically Signed by: Nidhi Porras MD

## 2024-06-26 NOTE — PROGRESS NOTES
Venipuncture Blood Specimen Collection  Venipuncture performed in left arm by Dania Ma MA with good hemostasis. Patient tolerated the procedure well without complications.   06/26/24   Dania Ma MA

## 2024-07-15 RX ORDER — MELOXICAM 15 MG/1
TABLET ORAL
Qty: 30 TABLET | Refills: 5 | OUTPATIENT
Start: 2024-07-15

## 2024-07-15 NOTE — TELEPHONE ENCOUNTER
Patient seen once on 7/26/23 and canceled first time return follow up. Patient needs appointment for meloxicam refill.

## 2024-08-16 DIAGNOSIS — Z78.0 POSTMENOPAUSAL: Primary | ICD-10-CM

## 2024-08-16 DIAGNOSIS — M81.0 AGE-RELATED OSTEOPOROSIS WITHOUT CURRENT PATHOLOGICAL FRACTURE: ICD-10-CM

## 2024-08-26 ENCOUNTER — LAB (OUTPATIENT)
Dept: ONCOLOGY | Facility: HOSPITAL | Age: 59
End: 2024-08-26
Payer: COMMERCIAL

## 2024-08-26 ENCOUNTER — INFUSION (OUTPATIENT)
Dept: ONCOLOGY | Facility: HOSPITAL | Age: 59
End: 2024-08-26
Payer: COMMERCIAL

## 2024-08-26 VITALS
BODY MASS INDEX: 27.37 KG/M2 | TEMPERATURE: 98.2 F | RESPIRATION RATE: 18 BRPM | HEART RATE: 96 BPM | WEIGHT: 154.5 LBS | OXYGEN SATURATION: 97 % | DIASTOLIC BLOOD PRESSURE: 78 MMHG | SYSTOLIC BLOOD PRESSURE: 130 MMHG

## 2024-08-26 DIAGNOSIS — Z78.0 POSTMENOPAUSAL: ICD-10-CM

## 2024-08-26 DIAGNOSIS — M81.0 AGE-RELATED OSTEOPOROSIS WITHOUT CURRENT PATHOLOGICAL FRACTURE: ICD-10-CM

## 2024-08-26 DIAGNOSIS — M81.0 AGE-RELATED OSTEOPOROSIS WITHOUT CURRENT PATHOLOGICAL FRACTURE: Primary | ICD-10-CM

## 2024-08-26 LAB
ALBUMIN SERPL-MCNC: 4.1 G/DL (ref 3.5–5.2)
ALBUMIN/GLOB SERPL: 1.5 G/DL
ALP SERPL-CCNC: 49 U/L (ref 39–117)
ALT SERPL W P-5'-P-CCNC: 15 U/L (ref 1–33)
ANION GAP SERPL CALCULATED.3IONS-SCNC: 13.7 MMOL/L (ref 5–15)
AST SERPL-CCNC: 19 U/L (ref 1–32)
BASOPHILS # BLD AUTO: 0.06 10*3/MM3 (ref 0–0.2)
BASOPHILS NFR BLD AUTO: 0.7 % (ref 0–1.5)
BILIRUB SERPL-MCNC: 0.4 MG/DL (ref 0–1.2)
BUN SERPL-MCNC: 10 MG/DL (ref 8–23)
BUN/CREAT SERPL: 13.5 (ref 7–25)
CALCIUM SPEC-SCNC: 8.9 MG/DL (ref 8.6–10.5)
CHLORIDE SERPL-SCNC: 103 MMOL/L (ref 98–107)
CO2 SERPL-SCNC: 23.3 MMOL/L (ref 22–29)
CREAT SERPL-MCNC: 0.74 MG/DL (ref 0.57–1)
DEPRECATED RDW RBC AUTO: 45.3 FL (ref 37–54)
EGFRCR SERPLBLD CKD-EPI 2021: 92.8 ML/MIN/1.73
EOSINOPHIL # BLD AUTO: 0.14 10*3/MM3 (ref 0–0.4)
EOSINOPHIL NFR BLD AUTO: 1.6 % (ref 0.3–6.2)
ERYTHROCYTE [DISTWIDTH] IN BLOOD BY AUTOMATED COUNT: 12.8 % (ref 12.3–15.4)
GLOBULIN UR ELPH-MCNC: 2.7 GM/DL
GLUCOSE SERPL-MCNC: 154 MG/DL (ref 65–99)
HCT VFR BLD AUTO: 41.1 % (ref 34–46.6)
HGB BLD-MCNC: 13.3 G/DL (ref 12–15.9)
IMM GRANULOCYTES # BLD AUTO: 0.03 10*3/MM3 (ref 0–0.05)
IMM GRANULOCYTES NFR BLD AUTO: 0.3 % (ref 0–0.5)
LYMPHOCYTES # BLD AUTO: 2.42 10*3/MM3 (ref 0.7–3.1)
LYMPHOCYTES NFR BLD AUTO: 27.4 % (ref 19.6–45.3)
MAGNESIUM SERPL-MCNC: 2.2 MG/DL (ref 1.6–2.4)
MCH RBC QN AUTO: 31.4 PG (ref 26.6–33)
MCHC RBC AUTO-ENTMCNC: 32.4 G/DL (ref 31.5–35.7)
MCV RBC AUTO: 96.9 FL (ref 79–97)
MONOCYTES # BLD AUTO: 0.49 10*3/MM3 (ref 0.1–0.9)
MONOCYTES NFR BLD AUTO: 5.6 % (ref 5–12)
NEUTROPHILS NFR BLD AUTO: 5.68 10*3/MM3 (ref 1.7–7)
NEUTROPHILS NFR BLD AUTO: 64.4 % (ref 42.7–76)
NRBC BLD AUTO-RTO: 0 /100 WBC (ref 0–0.2)
PHOSPHATE SERPL-MCNC: 3 MG/DL (ref 2.5–4.5)
PLATELET # BLD AUTO: 258 10*3/MM3 (ref 140–450)
PMV BLD AUTO: 9.9 FL (ref 6–12)
POTASSIUM SERPL-SCNC: 3.7 MMOL/L (ref 3.5–5.2)
PROT SERPL-MCNC: 6.8 G/DL (ref 6–8.5)
RBC # BLD AUTO: 4.24 10*6/MM3 (ref 3.77–5.28)
SODIUM SERPL-SCNC: 140 MMOL/L (ref 136–145)
WBC NRBC COR # BLD AUTO: 8.82 10*3/MM3 (ref 3.4–10.8)

## 2024-08-26 PROCEDURE — 85025 COMPLETE CBC W/AUTO DIFF WBC: CPT

## 2024-08-26 PROCEDURE — 80053 COMPREHEN METABOLIC PANEL: CPT

## 2024-08-26 PROCEDURE — 96372 THER/PROPH/DIAG INJ SC/IM: CPT

## 2024-08-26 PROCEDURE — 25010000002 DENOSUMAB 60 MG/ML SOLUTION PREFILLED SYRINGE: Performed by: INTERNAL MEDICINE

## 2024-08-26 PROCEDURE — 84100 ASSAY OF PHOSPHORUS: CPT

## 2024-08-26 PROCEDURE — 83735 ASSAY OF MAGNESIUM: CPT

## 2024-08-26 RX ADMIN — DENOSUMAB 60 MG: 60 INJECTION SUBCUTANEOUS at 15:33

## 2024-10-02 ENCOUNTER — OFFICE VISIT (OUTPATIENT)
Dept: ONCOLOGY | Facility: CLINIC | Age: 59
End: 2024-10-02
Payer: COMMERCIAL

## 2024-10-02 ENCOUNTER — LAB (OUTPATIENT)
Dept: ONCOLOGY | Facility: CLINIC | Age: 59
End: 2024-10-02
Payer: COMMERCIAL

## 2024-10-02 VITALS
BODY MASS INDEX: 26.9 KG/M2 | TEMPERATURE: 98 F | DIASTOLIC BLOOD PRESSURE: 80 MMHG | RESPIRATION RATE: 20 BRPM | SYSTOLIC BLOOD PRESSURE: 130 MMHG | WEIGHT: 151.8 LBS | HEART RATE: 98 BPM | OXYGEN SATURATION: 99 % | HEIGHT: 63 IN

## 2024-10-02 DIAGNOSIS — M81.0 AGE-RELATED OSTEOPOROSIS WITHOUT CURRENT PATHOLOGICAL FRACTURE: ICD-10-CM

## 2024-10-02 DIAGNOSIS — R53.83 FATIGUE, UNSPECIFIED TYPE: ICD-10-CM

## 2024-10-02 DIAGNOSIS — Z17.0 MALIGNANT NEOPLASM OF LEFT BREAST IN FEMALE, ESTROGEN RECEPTOR POSITIVE, UNSPECIFIED SITE OF BREAST: Primary | ICD-10-CM

## 2024-10-02 DIAGNOSIS — F32.A DEPRESSION, UNSPECIFIED DEPRESSION TYPE: ICD-10-CM

## 2024-10-02 DIAGNOSIS — E55.9 VITAMIN D DEFICIENCY: ICD-10-CM

## 2024-10-02 DIAGNOSIS — G51.0 BELL'S PALSY: ICD-10-CM

## 2024-10-02 DIAGNOSIS — Z17.0 MALIGNANT NEOPLASM OF LEFT BREAST IN FEMALE, ESTROGEN RECEPTOR POSITIVE, UNSPECIFIED SITE OF BREAST: ICD-10-CM

## 2024-10-02 DIAGNOSIS — C50.912 MALIGNANT NEOPLASM OF LEFT BREAST IN FEMALE, ESTROGEN RECEPTOR POSITIVE, UNSPECIFIED SITE OF BREAST: ICD-10-CM

## 2024-10-02 DIAGNOSIS — D03.71 MELANOMA IN SITU OF RIGHT LOWER EXTREMITY INCLUDING HIP: ICD-10-CM

## 2024-10-02 DIAGNOSIS — Z78.0 POSTMENOPAUSAL: ICD-10-CM

## 2024-10-02 DIAGNOSIS — C50.912 MALIGNANT NEOPLASM OF LEFT BREAST IN FEMALE, ESTROGEN RECEPTOR POSITIVE, UNSPECIFIED SITE OF BREAST: Primary | ICD-10-CM

## 2024-10-02 DIAGNOSIS — F41.9 ANXIETY: ICD-10-CM

## 2024-10-02 LAB
ALBUMIN SERPL-MCNC: 4.3 G/DL (ref 3.5–5.2)
ALBUMIN/GLOB SERPL: 1.5 G/DL
ALP SERPL-CCNC: 55 U/L (ref 39–117)
ALT SERPL W P-5'-P-CCNC: 16 U/L (ref 1–33)
ANION GAP SERPL CALCULATED.3IONS-SCNC: 8.7 MMOL/L (ref 5–15)
AST SERPL-CCNC: 21 U/L (ref 1–32)
BASOPHILS # BLD AUTO: 0.07 10*3/MM3 (ref 0–0.2)
BASOPHILS NFR BLD AUTO: 0.7 % (ref 0–1.5)
BILIRUB SERPL-MCNC: 0.6 MG/DL (ref 0–1.2)
BUN SERPL-MCNC: 15 MG/DL (ref 8–23)
BUN/CREAT SERPL: 21.1 (ref 7–25)
CALCIUM SPEC-SCNC: 9.1 MG/DL (ref 8.6–10.5)
CHLORIDE SERPL-SCNC: 106 MMOL/L (ref 98–107)
CO2 SERPL-SCNC: 24.3 MMOL/L (ref 22–29)
CREAT SERPL-MCNC: 0.71 MG/DL (ref 0.57–1)
DEPRECATED RDW RBC AUTO: 45.3 FL (ref 37–54)
EGFRCR SERPLBLD CKD-EPI 2021: 97.5 ML/MIN/1.73
EOSINOPHIL # BLD AUTO: 0.14 10*3/MM3 (ref 0–0.4)
EOSINOPHIL NFR BLD AUTO: 1.4 % (ref 0.3–6.2)
ERYTHROCYTE [DISTWIDTH] IN BLOOD BY AUTOMATED COUNT: 12.6 % (ref 12.3–15.4)
GLOBULIN UR ELPH-MCNC: 2.9 GM/DL
GLUCOSE SERPL-MCNC: 92 MG/DL (ref 65–99)
HCT VFR BLD AUTO: 43.8 % (ref 34–46.6)
HGB BLD-MCNC: 14.1 G/DL (ref 12–15.9)
IMM GRANULOCYTES # BLD AUTO: 0.02 10*3/MM3 (ref 0–0.05)
IMM GRANULOCYTES NFR BLD AUTO: 0.2 % (ref 0–0.5)
LYMPHOCYTES # BLD AUTO: 2.6 10*3/MM3 (ref 0.7–3.1)
LYMPHOCYTES NFR BLD AUTO: 25.9 % (ref 19.6–45.3)
MAGNESIUM SERPL-MCNC: 2.2 MG/DL (ref 1.6–2.4)
MCH RBC QN AUTO: 31 PG (ref 26.6–33)
MCHC RBC AUTO-ENTMCNC: 32.2 G/DL (ref 31.5–35.7)
MCV RBC AUTO: 96.3 FL (ref 79–97)
MONOCYTES # BLD AUTO: 0.8 10*3/MM3 (ref 0.1–0.9)
MONOCYTES NFR BLD AUTO: 8 % (ref 5–12)
NEUTROPHILS NFR BLD AUTO: 6.41 10*3/MM3 (ref 1.7–7)
NEUTROPHILS NFR BLD AUTO: 63.8 % (ref 42.7–76)
NRBC BLD AUTO-RTO: 0 /100 WBC (ref 0–0.2)
PHOSPHATE SERPL-MCNC: 3.2 MG/DL (ref 2.5–4.5)
PLATELET # BLD AUTO: 263 10*3/MM3 (ref 140–450)
PMV BLD AUTO: 9.5 FL (ref 6–12)
POTASSIUM SERPL-SCNC: 4.2 MMOL/L (ref 3.5–5.2)
PROT SERPL-MCNC: 7.2 G/DL (ref 6–8.5)
RBC # BLD AUTO: 4.55 10*6/MM3 (ref 3.77–5.28)
SODIUM SERPL-SCNC: 139 MMOL/L (ref 136–145)
TSH SERPL DL<=0.05 MIU/L-ACNC: 2.66 UIU/ML (ref 0.27–4.2)
WBC NRBC COR # BLD AUTO: 10.04 10*3/MM3 (ref 3.4–10.8)

## 2024-10-02 PROCEDURE — 84100 ASSAY OF PHOSPHORUS: CPT | Performed by: INTERNAL MEDICINE

## 2024-10-02 PROCEDURE — 83735 ASSAY OF MAGNESIUM: CPT | Performed by: INTERNAL MEDICINE

## 2024-10-02 PROCEDURE — 99214 OFFICE O/P EST MOD 30 MIN: CPT | Performed by: NURSE PRACTITIONER

## 2024-10-02 PROCEDURE — 82306 VITAMIN D 25 HYDROXY: CPT | Performed by: INTERNAL MEDICINE

## 2024-10-02 PROCEDURE — 80050 GENERAL HEALTH PANEL: CPT | Performed by: INTERNAL MEDICINE

## 2024-10-02 RX ORDER — ACYCLOVIR 800 MG/1
800 TABLET ORAL 2 TIMES DAILY
COMMUNITY
Start: 2024-06-11

## 2024-10-02 RX ORDER — CHLORTHALIDONE 25 MG/1
25 TABLET ORAL DAILY
COMMUNITY
Start: 2024-09-25

## 2024-10-02 NOTE — PROGRESS NOTES
NAME: Ubaldo Thomas    : 1965    DATE: 10/02/2024    DIAGNOSIS:   1.  Stage IA (pT1bN0(sn)MX) moderately differentiated invasive ductal carcinoma of the L breast.  Tumor was 6 mm in maximal dimension with associated DCIS.  Initial margin positive for DCIS with revision margins now clear.  0/2 SLN involved.  Tumor was 88% 3+ ER+, 86 % 3+ RI+ and HER-2/Frances Negative (1+) with negative FISH testing.  Mammaprint Low risk Luminal-Type A.    2.  Osteoporosis Baseline DEXA 22 with T score -3.1      TREATMENT HISTORY:    1.  L Lumpectomy, SLNBx (Dr. Jackson) 10-17-22  2.  Revision for clearance of margins 10-26-22  3.  Dr. Mccrary delivered accelerated partial breast irradiation between23 and 23  4. Started Tamoxifen daily 23      CHIEF COMPLAINT:  Follow-up of Breast cancer      HISTORY OF PRESENT ILLNESS:   Ubaldo Thomas is a very pleasant 59 y.o. female who was referred by Dr. Elva Jackson for evaluation and treatment of breast cancer.  She was in her usual state of health when she underwent screening mammogram as below which was abnormal.  She had a biopsy on 2022 revealing an invasive mammary carcinoma, grade 2 with associated DCIS.  Tumor was ER/RI positive and HER2/frances negative.  She underwent genetic testing through [facility] which showed no genetic mutations.  On 10/17/2022 Dr. Jackson took her for left lumpectomy and sentinel lymph node biopsy.  Small tumor was resected as below and margins were clear for invasive ductal carcinoma, but she did have involvement of the anterior margin by DCIS.  She underwent revision on 10/26/2022 with clearance of margins.  She is referred here for discussion of further therapy.    Ms. Thomas is overall doing well.  She denies weight loss, chest pain, shortness of breath, abdominal pain, nausea, vomiting, diarrhea or constipation.  She denies rectal bleeding.  She denies bone pain, headaches or visual change.      INTERVAL HISTORY:    Ms. Thomas presents today for follow up of breast cancer. Overall, she reports that she has been doing well since her last visit. She continues to take Tamoxifen daily and is tolerating this well. She reports that she has stopped taking Calcium because it is difficult for her to swallow and she doesn't like the gummy combination. She will undergo surgery of her face at [place] ([facility]) in November. She is without any specific complaints at this time.           PAST MEDICAL HISTORY:  Past Medical History:   Diagnosis Date    Bell's palsy     Breast cancer     left    History of radiation therapy 2023    Left breast APBI    Hx of radiation therapy 02/15/2023    5 treatments, (L) breast    Kidney stones     Skin cancer     Melanoma InSitu     Risk Factors:  Age of Menarche: 13 years old  Age of Menopause: 40s  Prior Breast Disease: She has history of fibrocystic breast disease but no history of abnormal mammograms or biopsies  Pregnancies:    Age of 1st pregnancy: 29 years old  Family History of Breast Cancer: None  Family History of Ovarian Cancer: Maternal grandmother had some type of malignancy in her 60s which was possibly ovarian cancer although this is uncertain  History of HRT use: None    PAST SURGICAL HISTORY:  Past Surgical History:   Procedure Laterality Date    BREAST LUMPECTOMY Left 10/17/2022    KIDNEY STONE SURGERY     - History of removal of melanoma in situ involving right posterior thigh  - 2022 underwent surgery for left facial nerve reanimation for treatment of Bell's palsy.    FAMILY HISTORY:  Family History   Problem Relation Age of Onset    Diabetes Mother     Heart disease Father     Breast cancer Neg Hx        SOCIAL HISTORY:  Social History     Socioeconomic History    Marital status:    Tobacco Use    Smoking status: Never    Smokeless tobacco: Never   Vaping Use    Vaping status: Never Used   Substance and Sexual Activity    Alcohol use: Yes     Comment:  "occasionally    Drug use: Never    Sexual activity: Defer         REVIEW OF SYSTEMS:   A comprehensive 14 point review of systems was performed.  Significant findings as mentioned above.  All other systems reviewed and are negative.      MEDICATIONS:  The current medication list was reviewed in the EMR    Current Outpatient Medications:     acyclovir (ZOVIRAX) 800 MG tablet, Take 1 tablet by mouth 2 (Two) Times a Day., Disp: , Rfl:     atorvastatin (LIPITOR) 20 MG tablet, , Disp: , Rfl:     chlorthalidone (HYGROTON) 25 MG tablet, Take 1 tablet by mouth Daily., Disp: , Rfl:     escitalopram (LEXAPRO) 10 MG tablet, Take 1 tablet by mouth Daily., Disp: 90 tablet, Rfl: 3    meloxicam (MOBIC) 15 MG tablet, Take 1 tablet by mouth Daily., Disp: , Rfl:     tamoxifen (NOLVADEX) 20 MG chemo tablet, Take 1 tablet by mouth Daily., Disp: 90 tablet, Rfl: 3    vitamin D (ERGOCALCIFEROL) 1.25 MG (51990 UT) capsule capsule, Take 1 capsule by mouth 1 (One) Time Per Week., Disp: 4 capsule, Rfl: 3    Calcium Carbonate-Vitamin D 600-10 MG-MCG per tablet, Take 1 tablet by mouth 2 (Two) Times a Day. (Patient not taking: Reported on 10/2/2024), Disp: 60 tablet, Rfl: 5    clonazePAM (KlonoPIN) 0.5 MG tablet, , Disp: , Rfl:     ALLERGIES:    Allergies   Allergen Reactions    Dexamethasone Arrhythmia       PHYSICAL EXAM:  Vitals:    10/02/24 1551   BP: 130/80   Pulse: 98   Resp: 20   Temp: 98 °F (36.7 °C)   TempSrc: Temporal   SpO2: 99%   Weight: 68.9 kg (151 lb 12.8 oz)   Height: 160 cm (63\")   PainSc: 0-No pain       Body surface area is 1.72 meters squared.   Body mass index is 26.89 kg/m².     ECOG score: 0   General:  Awake, alert and oriented, appears well today. In no acute distress.  HEENT:  Pupils are equal, round and reactive to light and accommodation, Extra-ocular movements full, Oropharyx clear, mucous membranes moist.  She has asymmetry of the face consistent with left sided Bell's palsy.  Neck:  No JVD, thyromegaly or " lymphadenopathy  CV:  Regular rate and rhythm, no murmurs, rubs or gallops  Resp:  Lungs are clear to auscultation bilaterally no wheezing  Breast: Status post left lumpectomy and revision as well as left sentinel lymph node biopsy.  She has some firmer tissue around the sites of her surgeries as well as what appears to be an old biopsy site.  No palpable mass lesions.  No left axillary adenopathy.  Right breast without mass lesions.  Right axilla without adenopathy.  Abd:  Soft, non-tender, non-distended, bowel sounds present, no palpable hepatosplenomegaly or mass lesions  Ext:  No clubbing, cyanosis, no lower extremity edema  Lymph:  No cervical, supraclavicular, axillary adenopathy  Neuro:  MS as above, CN II-XII intact, grossly non-focal exam      PATHOLOGY:    09/28/2022            FISH Analysis 09/28/2022      10/17/2022      10/26/2022      Genetic Testing 11/07/2022    Mammaprint 11/28/22          ENDOSCOPY:    Colonoscopy 10/16/17          IMAGING:    Bilateral Diagnostic Mammogram with Bilateral Breast Ultrasound ([facility]) 09/27/2022        Bilateral Breast MRI ([facility]) 09/30/2022      Mammoprint Results 11/28/22      DEXA Bone Density Axial (12/08/2022 14:11)  FINDINGS: The BMD measured at the AP spine L1-L4 is 0.808 gm/cm2 with a  T-score of -3.1.      IMPRESSION:  The patient is considered to be osteoporotic according to the  World Health Organization criteria. Fracture risk is high.     Mammo Diagnostic Digital Tomosynthesis Bilateral With CAD (05/25/2023 12:44)   FINDINGS: The breasts are heterogeneously dense, which may obscure small  masses.     Presumed postoperative changes are now noted in the left 12:00 region  from the patient's interval conservation surgery. Mild skin thickening  is also noted in the left breast which is likely treatment related.  There are a few bilateral scattered benign-appearing calcifications. No  dominant mass, suspicious calcifications, or nonsurgical areas  of  architectural distortion are seen in either breast.     IMPRESSION:  Incomplete examination as comparison with prior imaging of  the right breast is needed.        BI-RADS CATEGORY:   0, INCOMPLETE:  Need prior mammograms for comparison        RECOMMENDATION:  We will obtain the patient's prior outside mammographic  imaging for comparison. Also recommend 6 month follow-up diagnostic left  mammogram.    Addendum  Outside mammographic images dated 08/31/2022, 08/05/2022, and 07/23/2021  were compared to the exam performed at our facility on 05/25/2023. The  fibroglandular pattern of the right breast is stable in appearance.     FINAL ACR BI-RADS CATEGORY: 3, PROBABLY BENIGN     RECOMMENDATION: Recommend 6 month follow-up diagnostic left mammogram.     Mammo Diagnostic Digital Tomosynthesis Left With CAD (11/28/2023 10:22)   FINDINGS: The left breast heterogeneously dense, which may obscure small  masses.     Postoperative changes being followed in the left 12:00 region are stable  in appearance as is the remaining fibroglandular pattern. There are  stable scattered calcifications. Mild skin thickening is again noted and  likely treatment related. No new or suspicious findings are identified.     IMPRESSION:  Probably benign left mammographic findings. Recommend  continued follow-up.        BI-RADS CATEGORY: 3, PROBABLY BENIGN    Mammo Diagnostic Digital Tomosynthesis Bilateral With CAD (05/28/2024 10:05)   FINDINGS: There are scattered areas of fibroglandular density.     The bilateral fibroglandular pattern is stable in appearance including  postoperative changes being followed in the superior aspect of the left  breast. There are stable scattered calcifications. Mild skin thickening  is again noted involving the left breast which is likely treatment  related. No new or suspicious findings are identified in either breast.     IMPRESSION:     1. Benign right mammographic findings.     2. Probably benign left  mammographic findings. Recommend continued  follow-up.     BI-RADS CATEGORY: 3, PROBABLY BENIGN     RECOMMENDATION: Recommend additional 6-month follow-up diagnostic left  mammogram.    RECENT LABS:  Lab Results   Component Value Date    WBC 10.04 10/02/2024    HGB 14.1 10/02/2024    HCT 43.8 10/02/2024    MCV 96.3 10/02/2024    RDW 12.6 10/02/2024     10/02/2024    NEUTRORELPCT 63.8 10/02/2024    LYMPHORELPCT 25.9 10/02/2024    MONORELPCT 8.0 10/02/2024    EOSRELPCT 1.4 10/02/2024    BASORELPCT 0.7 10/02/2024    NEUTROABS 6.41 10/02/2024    LYMPHSABS 2.60 10/02/2024       Lab Results   Component Value Date     10/02/2024    K 4.2 10/02/2024    CO2 24.3 10/02/2024     10/02/2024    BUN 15 10/02/2024    CREATININE 0.71 10/02/2024    GLUCOSE 92 10/02/2024    CALCIUM 9.1 10/02/2024    ALKPHOS 55 10/02/2024    AST 21 10/02/2024    ALT 16 10/02/2024    BILITOT 0.6 10/02/2024    ALBUMIN 4.3 10/02/2024    PROTEINTOT 7.2 10/02/2024    MG 2.2 10/02/2024    PHOS 3.2 10/02/2024     Lab Results   Component Value Date    FERRITIN 129.40 11/15/2022    IRON 57 11/15/2022    TIBC 350 11/15/2022    LABIRON 16 (L) 11/15/2022    [id number] 594 11/15/2022    FOLATE 9.82 11/15/2022     Lab Results   Component Value Date    TSH 2.660 10/02/2024    RXQB39VC 49.0 10/02/2024     Lab Results   Component Value Date     26.2 (H) 11/15/2022     Lab Results   Component Value Date    LABCA2 31.1 11/15/2022           ASSESSMENT & PLAN:  Ubaldo Thomas is a very pleasant 59 y.o. female with recently diagnosed Stage IA (pT1bN0(sn)MX) moderately differentiated invasive ductal carcinoma of the L breast.  Tumor was 6 mm in maximal dimension with associated DCIS.  Initial margin positive for DCIS with revision margins now clear.  0/2 SLN involved.  Tumor was 88% 3+ ER+, 86 % 3+ NE+ and HER-2/Frances Negative (1+) with negative FISH testing.  Mammaprint Low Risk Luminal-Type A.    1.  Left Breast Cancer:  - S/p L Lumpectomy  10-17-22 with SLNBx with revision of margin on 10-26-22 for involvement with DCIS.  Surgical margins clear.    - Overall her tumor has favorable characteristics including strong hormonal positivity.  - Given tumor size just >5 mm, recommended we send genomic profiling to determine whether risk is sufficient to necessitate treatment with chemotherapy. Happily, testing returned as low risk so chemotherapy was not recommended.  - Returned to Dr. Claudia Mccrary for accelerated partial breast irradiation which she completed on 1/27/2023.  She did very well with her treatment.  - She then required hormonal therapy given strongly hormonally driven cancer.  She is postmenopausal and has an intact uterus.  She had pre-existing osteoporosis with a T score of -3.1 at baseline.  Discussed the reason for hormonal therapy, and different options including treatment with an aromatase inhibitor versus tamoxifen.  Discussed the relative risks benefits and side effects of each, and after conversation, she decided to proceed with tamoxifen therapy.  She does understand that this carries with it a small but increased risk of development of endometrial cancer and she knows she will need to see her gynecologist once a year. Gynecological exam in July 2024 was normal.   - Bilateral diagnostic mammogram 5/28/2024 without cause for concern. She will be due for left diagnostic mammogram after 11/28/2024. Order placed today.  - Exam and labs from today without cause for concern. Will continue Tamoxifen daily.  - She will follow up with MD as scheduled with CBCD, CMP, Vitamin D and TSH prior.      2.  Osteoporosis:  - Baseline DEXA scan performed 12/8/2022 showed osteoporosis with a T score of -3.1.  Recommended treatment with Prolia.  She is taking calcium with vitamin D twice daily.  She finds it hard to swallow the large pills so recommended OTC gummy supplement instead. She states that she is not able to tolerate the gummy and will see if  there is a Calcium liquid suspension available. If liquid is not available she plans to continue gummy.  - Continue weekly high dose Vit D.  - Vitamin D replete. Repeat level pending from today.  - Repeat DEXA scan is scheduled for 12/09/2024.      3.  Anxiety / Depression:  - Doing much better on Lexapro 10 mg daily.  Will continue.     4.   H/o melanoma in situ RLE many years ago:  -  Continues to f/u with dermatology yearly.  Reports had exam there ([facility] in [place]) in May 2024 with full skin exam and no concerning findings.    5.  Prophylaxis:  -  Ubaldo Thomas received 2024 flu vaccine.  -  Ubaldo Thomas received 4 doses COVID vaccine. and received bivalent booster.  She also received 2023 Fall Booster.  -  Ubaldo Thomas has had colonoscopy in 2018 performed by Dr. Montgomery. She says exam was normal without polyps. Repeat exam will be due in 2028.    6. Follow Up:  - Continue Tamoxifen daily.  - Continue Prolia q6 months with Ca/Vit D and weekly Vit D.   - Left diagnostic mammogram after 11/28/2024.  - DEXA scan as scheduled on 12/09/2024.  - With [place] ([facility]) as scheduled in November.  - With Dr. Porras as scheduled with CBCD, CMP, Vitamin D and TSH.      ACO / KIARA/Other  Quality measures  -  Ubaldo Thomas received 2024 flu vaccine.  -  Ubaldo Thomas reports a pain score of 0.    -  Current outpatient and discharge medications have been reconciled for the patient.  Reviewed by: CHELE Sanchez        I spent 30 minutes caring for Ubaldo on this date of service. This time includes time spent by me in the following activities: preparing for the visit, reviewing tests, performing a medically appropriate examination and/or evaluation, counseling and educating the patient/family/caregiver, referring and communicating with other health care professionals, documenting information in the medical record, independently interpreting  results and communicating that information with the patient/family/caregiver, care coordination, ordering test(s), obtaining a separately obtained history, and reviewing a separately obtained history.                         Electronically Signed by: CHELE Sanchez

## 2024-10-02 NOTE — PROGRESS NOTES
Venipuncture Blood Specimen Collection  Venipuncture performed in left arm by Marleny Cameron MA with good hemostasis. Patient tolerated the procedure well without complications.   10/02/24   Marleny Cameron MA

## 2024-10-03 LAB — 25(OH)D3 SERPL-MCNC: 49 NG/ML (ref 30–100)

## 2024-12-03 ENCOUNTER — HOSPITAL ENCOUNTER (OUTPATIENT)
Dept: BONE DENSITY | Facility: HOSPITAL | Age: 59
Discharge: HOME | End: 2024-12-03
Payer: COMMERCIAL

## 2024-12-03 ENCOUNTER — HOSPITAL ENCOUNTER (OUTPATIENT)
Dept: MAMMOGRAPHY | Facility: HOSPITAL | Age: 59
Discharge: HOME | End: 2024-12-03
Payer: COMMERCIAL

## 2024-12-03 DIAGNOSIS — C50.912 MALIGNANT NEOPLASM OF LEFT BREAST IN FEMALE, ESTROGEN RECEPTOR POSITIVE, UNSPECIFIED SITE OF BREAST: ICD-10-CM

## 2024-12-03 DIAGNOSIS — D03.71 MELANOMA IN SITU OF RIGHT LOWER EXTREMITY INCLUDING HIP: ICD-10-CM

## 2024-12-03 DIAGNOSIS — M81.0 AGE-RELATED OSTEOPOROSIS WITHOUT CURRENT PATHOLOGICAL FRACTURE: ICD-10-CM

## 2024-12-03 DIAGNOSIS — Z78.0 POSTMENOPAUSAL: ICD-10-CM

## 2024-12-03 DIAGNOSIS — Z17.0 MALIGNANT NEOPLASM OF LEFT BREAST IN FEMALE, ESTROGEN RECEPTOR POSITIVE, UNSPECIFIED SITE OF BREAST: ICD-10-CM

## 2024-12-03 PROCEDURE — G0279 TOMOSYNTHESIS, MAMMO: HCPCS

## 2024-12-03 PROCEDURE — 77065 DX MAMMO INCL CAD UNI: CPT

## 2024-12-03 PROCEDURE — 77080 DXA BONE DENSITY AXIAL: CPT

## 2024-12-16 RX ORDER — ERGOCALCIFEROL 1.25 MG/1
50000 CAPSULE, LIQUID FILLED ORAL WEEKLY
Qty: 4 CAPSULE | Refills: 3 | Status: SHIPPED | OUTPATIENT
Start: 2024-12-16

## 2025-02-04 ENCOUNTER — TELEPHONE (OUTPATIENT)
Dept: ONCOLOGY | Facility: CLINIC | Age: 60
End: 2025-02-04
Payer: COMMERCIAL

## 2025-02-04 NOTE — TELEPHONE ENCOUNTER
Caller: Ubaldo Thomas    Relationship to patient: Self    Best call back number: [phone]     Chief complaint: PATIENT TO RESCHEDULE FROM 2/18/25 APPTS    Type of visit: LAB AND FU2    Requested date: NEXT AVAILABLE, WOULD PREFER WITH [facility] ON 2/24/25

## 2025-02-06 DIAGNOSIS — Z78.0 POSTMENOPAUSAL: Primary | ICD-10-CM

## 2025-02-06 DIAGNOSIS — M81.0 AGE-RELATED OSTEOPOROSIS WITHOUT CURRENT PATHOLOGICAL FRACTURE: ICD-10-CM

## 2025-02-24 ENCOUNTER — INFUSION (OUTPATIENT)
Dept: ONCOLOGY | Facility: HOSPITAL | Age: 60
End: 2025-02-24
Payer: COMMERCIAL

## 2025-02-24 ENCOUNTER — LAB (OUTPATIENT)
Dept: ONCOLOGY | Facility: HOSPITAL | Age: 60
End: 2025-02-24
Payer: COMMERCIAL

## 2025-02-24 VITALS
SYSTOLIC BLOOD PRESSURE: 117 MMHG | OXYGEN SATURATION: 98 % | HEART RATE: 90 BPM | RESPIRATION RATE: 16 BRPM | TEMPERATURE: 98.1 F | DIASTOLIC BLOOD PRESSURE: 70 MMHG

## 2025-02-24 DIAGNOSIS — R53.83 FATIGUE, UNSPECIFIED TYPE: ICD-10-CM

## 2025-02-24 DIAGNOSIS — D03.71 MELANOMA IN SITU OF RIGHT LOWER EXTREMITY INCLUDING HIP: ICD-10-CM

## 2025-02-24 DIAGNOSIS — M81.0 AGE-RELATED OSTEOPOROSIS WITHOUT CURRENT PATHOLOGICAL FRACTURE: ICD-10-CM

## 2025-02-24 DIAGNOSIS — E55.9 VITAMIN D DEFICIENCY: ICD-10-CM

## 2025-02-24 DIAGNOSIS — Z17.0 MALIGNANT NEOPLASM OF LEFT BREAST IN FEMALE, ESTROGEN RECEPTOR POSITIVE, UNSPECIFIED SITE OF BREAST: ICD-10-CM

## 2025-02-24 DIAGNOSIS — Z78.0 POSTMENOPAUSAL: ICD-10-CM

## 2025-02-24 DIAGNOSIS — C50.912 MALIGNANT NEOPLASM OF LEFT BREAST IN FEMALE, ESTROGEN RECEPTOR POSITIVE, UNSPECIFIED SITE OF BREAST: ICD-10-CM

## 2025-02-24 LAB
ALBUMIN SERPL-MCNC: 4 G/DL (ref 3.5–5.2)
ALBUMIN/GLOB SERPL: 1.5 G/DL
ALP SERPL-CCNC: 54 U/L (ref 39–117)
ALT SERPL W P-5'-P-CCNC: 13 U/L (ref 1–33)
ANION GAP SERPL CALCULATED.3IONS-SCNC: 9.3 MMOL/L (ref 5–15)
AST SERPL-CCNC: 18 U/L (ref 1–32)
BASOPHILS # BLD AUTO: 0.06 10*3/MM3 (ref 0–0.2)
BASOPHILS NFR BLD AUTO: 0.9 % (ref 0–1.5)
BILIRUB SERPL-MCNC: 0.4 MG/DL (ref 0–1.2)
BUN SERPL-MCNC: 11 MG/DL (ref 8–23)
BUN/CREAT SERPL: 17.2 (ref 7–25)
CALCIUM SPEC-SCNC: 9.1 MG/DL (ref 8.6–10.5)
CHLORIDE SERPL-SCNC: 105 MMOL/L (ref 98–107)
CO2 SERPL-SCNC: 26.7 MMOL/L (ref 22–29)
CREAT SERPL-MCNC: 0.64 MG/DL (ref 0.57–1)
DEPRECATED RDW RBC AUTO: 43.9 FL (ref 37–54)
EGFRCR SERPLBLD CKD-EPI 2021: 101.3 ML/MIN/1.73
EOSINOPHIL # BLD AUTO: 0.15 10*3/MM3 (ref 0–0.4)
EOSINOPHIL NFR BLD AUTO: 2.2 % (ref 0.3–6.2)
ERYTHROCYTE [DISTWIDTH] IN BLOOD BY AUTOMATED COUNT: 12.6 % (ref 12.3–15.4)
GLOBULIN UR ELPH-MCNC: 2.6 GM/DL
GLUCOSE SERPL-MCNC: 91 MG/DL (ref 65–99)
HCT VFR BLD AUTO: 40.6 % (ref 34–46.6)
HGB BLD-MCNC: 12.8 G/DL (ref 12–15.9)
IMM GRANULOCYTES # BLD AUTO: 0.01 10*3/MM3 (ref 0–0.05)
IMM GRANULOCYTES NFR BLD AUTO: 0.1 % (ref 0–0.5)
LYMPHOCYTES # BLD AUTO: 2.22 10*3/MM3 (ref 0.7–3.1)
LYMPHOCYTES NFR BLD AUTO: 32.4 % (ref 19.6–45.3)
MAGNESIUM SERPL-MCNC: 2.2 MG/DL (ref 1.6–2.4)
MCH RBC QN AUTO: 30 PG (ref 26.6–33)
MCHC RBC AUTO-ENTMCNC: 31.5 G/DL (ref 31.5–35.7)
MCV RBC AUTO: 95.3 FL (ref 79–97)
MONOCYTES # BLD AUTO: 0.54 10*3/MM3 (ref 0.1–0.9)
MONOCYTES NFR BLD AUTO: 7.9 % (ref 5–12)
NEUTROPHILS NFR BLD AUTO: 3.87 10*3/MM3 (ref 1.7–7)
NEUTROPHILS NFR BLD AUTO: 56.5 % (ref 42.7–76)
NRBC BLD AUTO-RTO: 0 /100 WBC (ref 0–0.2)
PHOSPHATE SERPL-MCNC: 3.6 MG/DL (ref 2.5–4.5)
PLATELET # BLD AUTO: 258 10*3/MM3 (ref 140–450)
PMV BLD AUTO: 9.2 FL (ref 6–12)
POTASSIUM SERPL-SCNC: 4.1 MMOL/L (ref 3.5–5.2)
PROT SERPL-MCNC: 6.6 G/DL (ref 6–8.5)
RBC # BLD AUTO: 4.26 10*6/MM3 (ref 3.77–5.28)
SODIUM SERPL-SCNC: 141 MMOL/L (ref 136–145)
TSH SERPL DL<=0.05 MIU/L-ACNC: 2.51 UIU/ML (ref 0.27–4.2)
WBC NRBC COR # BLD AUTO: 6.85 10*3/MM3 (ref 3.4–10.8)

## 2025-02-24 PROCEDURE — 80050 GENERAL HEALTH PANEL: CPT

## 2025-02-24 PROCEDURE — G0463 HOSPITAL OUTPT CLINIC VISIT: HCPCS

## 2025-02-24 PROCEDURE — 83735 ASSAY OF MAGNESIUM: CPT | Performed by: INTERNAL MEDICINE

## 2025-02-24 PROCEDURE — 82306 VITAMIN D 25 HYDROXY: CPT

## 2025-02-24 PROCEDURE — 84100 ASSAY OF PHOSPHORUS: CPT | Performed by: INTERNAL MEDICINE

## 2025-02-24 NOTE — PROGRESS NOTES
Patient arrived for treatment.  Vital signs noted.  Nursing assessment completed.  Treatment was held today due to patient reporting she has a small cavity that she is scheduled to have a filling done tomorrow.  Patient is scheduled to return on 3/26/25 at 1130 AM.    Renetta Castillo RN

## 2025-02-25 LAB — 25(OH)D3 SERPL-MCNC: 60.6 NG/ML (ref 30–100)

## 2025-03-17 DIAGNOSIS — E55.9 VITAMIN D DEFICIENCY: Primary | ICD-10-CM

## 2025-03-17 DIAGNOSIS — R53.83 FATIGUE, UNSPECIFIED TYPE: ICD-10-CM

## 2025-03-21 DIAGNOSIS — M81.0 AGE-RELATED OSTEOPOROSIS WITHOUT CURRENT PATHOLOGICAL FRACTURE: ICD-10-CM

## 2025-03-21 DIAGNOSIS — Z78.0 POSTMENOPAUSAL: Primary | ICD-10-CM

## 2025-03-24 ENCOUNTER — TELEPHONE (OUTPATIENT)
Dept: ONCOLOGY | Facility: CLINIC | Age: 60
End: 2025-03-24
Payer: COMMERCIAL

## 2025-03-24 NOTE — TELEPHONE ENCOUNTER
Caller: Ubaldo Thomas    Relationship to patient: Self    Best call back number: [phone]     Chief complaint: PATIENT TO RESCHEDULE LAB AND FU ONLY    Type of visit: LAB AND FU ONLY    Requested date: PATIENT WISHES TO ONLY SEE MD [facility] ON NEXT VISIT AND REQUESTING TO RESCHEDULE LAB AND FU APPTS TO DO SO. REQUESTING AN APPT LATER THAN 830 AM    PATIENT WOULD LIKE TO KEEP [facility] INJECTION ON 3/26/25

## 2025-03-26 ENCOUNTER — INFUSION (OUTPATIENT)
Dept: ONCOLOGY | Facility: HOSPITAL | Age: 60
End: 2025-03-26
Payer: COMMERCIAL

## 2025-03-26 VITALS
TEMPERATURE: 98.7 F | BODY MASS INDEX: 26.82 KG/M2 | DIASTOLIC BLOOD PRESSURE: 78 MMHG | HEART RATE: 86 BPM | OXYGEN SATURATION: 97 % | SYSTOLIC BLOOD PRESSURE: 129 MMHG | WEIGHT: 151.4 LBS | RESPIRATION RATE: 18 BRPM

## 2025-03-26 DIAGNOSIS — R53.83 FATIGUE, UNSPECIFIED TYPE: ICD-10-CM

## 2025-03-26 DIAGNOSIS — Z78.0 POSTMENOPAUSAL: Primary | ICD-10-CM

## 2025-03-26 DIAGNOSIS — E55.9 VITAMIN D DEFICIENCY: ICD-10-CM

## 2025-03-26 DIAGNOSIS — M81.0 AGE-RELATED OSTEOPOROSIS WITHOUT CURRENT PATHOLOGICAL FRACTURE: ICD-10-CM

## 2025-03-26 PROCEDURE — 25010000002 DENOSUMAB 60 MG/ML SOLUTION PREFILLED SYRINGE: Performed by: INTERNAL MEDICINE

## 2025-03-26 PROCEDURE — 96372 THER/PROPH/DIAG INJ SC/IM: CPT

## 2025-03-26 RX ADMIN — DENOSUMAB 60 MG: 60 INJECTION SUBCUTANEOUS at 11:20

## 2025-04-09 NOTE — PROGRESS NOTES
NAME: Ubaldo Thomas    : 1965    DATE: 2025    DIAGNOSIS:   1.  Stage IA (pT1bN0(sn)MX) moderately differentiated invasive ductal carcinoma of the L breast.  Tumor was 6 mm in maximal dimension with associated DCIS.  Initial margin positive for DCIS with revision margins now clear.  0/2 SLN involved.  Tumor was 88% 3+ ER+, 86 % 3+ OK+ and HER-2/Frances Negative (1+) with negative FISH testing.  Mammaprint Low risk Luminal-Type A.    2.  Osteoporosis Baseline DEXA 22 with T score -3.1      TREATMENT HISTORY:    1.  L Lumpectomy, SLNBx (Dr. Jackson) 10-17-22  2.  Revision for clearance of margins 10-26-22  3.  Dr. Mccrary delivered accelerated partial breast irradiation between23 and 23  4. Started Tamoxifen daily 23      CHIEF COMPLAINT:  Follow-up of Breast cancer      HISTORY OF PRESENT ILLNESS:   Ubaldo Thomas is a very pleasant 59 y.o. female who was referred by Dr. Elva Jackson for evaluation and treatment of breast cancer.  She was in her usual state of health when she underwent screening mammogram as below which was abnormal.  She had a biopsy on 2022 revealing an invasive mammary carcinoma, grade 2 with associated DCIS.  Tumor was ER/OK positive and HER2/frances negative.  She underwent genetic testing through [facility] which showed no genetic mutations.  On 10/17/2022 Dr. Jackson took her for left lumpectomy and sentinel lymph node biopsy.  Small tumor was resected as below and margins were clear for invasive ductal carcinoma, but she did have involvement of the anterior margin by DCIS.  She underwent revision on 10/26/2022 with clearance of margins.  She is referred here for discussion of further therapy.    Ms. Thomas is overall doing well.  She denies weight loss, chest pain, shortness of breath, abdominal pain, nausea, vomiting, diarrhea or constipation.  She denies rectal bleeding.  She denies bone pain, headaches or visual change.      INTERVAL HISTORY:    Ms. Thomas presents today for follow up of breast cancer.  Since her last visit, she has overall been well.  She continues to tolerate tamoxifen without difficulty asked for refill today.  She has not been reliably taking her calcium because she has trouble with this.  We discussed multiple different formulations she could use to try it.  Of note her calcium level is just slightly low today which we discussed.  She does understand the importance of that and helping her bone strength and given that she is on Prolia treatment.  She is otherwise well without complaint.  Her daughter is having to see Dr. Pal but that is apparently going well also.      PAST MEDICAL HISTORY:  Past Medical History:   Diagnosis Date    Bell's palsy     Breast cancer     left    History of radiation therapy 2023    Left breast APBI    Hx of radiation therapy 02/15/2023    5 treatments, (L) breast    Kidney stones     Skin cancer     Melanoma InSitu     Risk Factors:  Age of Menarche: 13 years old  Age of Menopause: 40s  Prior Breast Disease: She has history of fibrocystic breast disease but no history of abnormal mammograms or biopsies  Pregnancies:    Age of 1st pregnancy: 29 years old  Family History of Breast Cancer: None  Family History of Ovarian Cancer: Maternal grandmother had some type of malignancy in her 60s which was possibly ovarian cancer although this is uncertain  History of HRT use: None    PAST SURGICAL HISTORY:  Past Surgical History:   Procedure Laterality Date    BREAST LUMPECTOMY Left 10/17/2022    KIDNEY STONE SURGERY     - History of removal of melanoma in situ involving right posterior thigh  - 2022 underwent surgery for left facial nerve reanimation for treatment of Bell's palsy.    FAMILY HISTORY:  Family History   Problem Relation Age of Onset    Diabetes Mother     Heart disease Father     Breast cancer Neg Hx        SOCIAL HISTORY:  Social History     Socioeconomic History    Marital  status:    Tobacco Use    Smoking status: Never    Smokeless tobacco: Never   Vaping Use    Vaping status: Never Used   Substance and Sexual Activity    Alcohol use: Yes     Comment: occasionally    Drug use: Never    Sexual activity: Defer         REVIEW OF SYSTEMS:   A comprehensive 14 point review of systems was performed.  Significant findings as mentioned above.  All other systems reviewed and are negative.      MEDICATIONS:  The current medication list was reviewed in the EMR    Current Outpatient Medications:     acyclovir (ZOVIRAX) 800 MG tablet, Take 1 tablet by mouth 2 (Two) Times a Day., Disp: , Rfl:     atorvastatin (LIPITOR) 20 MG tablet, , Disp: , Rfl:     chlorthalidone (HYGROTON) 25 MG tablet, Take 1 tablet by mouth Daily., Disp: , Rfl:     escitalopram (LEXAPRO) 10 MG tablet, Take 1 tablet by mouth Daily., Disp: 90 tablet, Rfl: 3    meloxicam (MOBIC) 15 MG tablet, Take 1 tablet by mouth Daily., Disp: , Rfl:     tamoxifen (NOLVADEX) 20 MG chemo tablet, Take 1 tablet by mouth Daily., Disp: 90 tablet, Rfl: 3    vitamin D (ERGOCALCIFEROL) 1.25 MG (74815 UT) capsule capsule, TAKE 1 CAPSULE BY MOUTH ONCE WEEKLY, Disp: 4 capsule, Rfl: 3    Calcium Carbonate-Vitamin D 600-10 MG-MCG per tablet, Take 1 tablet by mouth 2 (Two) Times a Day. (Patient not taking: Reported on 4/11/2025), Disp: 60 tablet, Rfl: 5    clonazePAM (KlonoPIN) 0.5 MG tablet, , Disp: , Rfl:     ALLERGIES:    Allergies   Allergen Reactions    Dexamethasone Arrhythmia       PHYSICAL EXAM:  Vitals:    04/11/25 1332   BP: 118/78   Pulse: 87   Resp: 18   Temp: 97.1 °F (36.2 °C)   TempSrc: Temporal   SpO2: 98%   Weight: 70.2 kg (154 lb 12.8 oz)   PainSc: 0-No pain         Body surface area is 1.73 meters squared.   Body mass index is 27.42 kg/m².         General:  Awake, alert and oriented, appears well today. In no acute distress.  In good spirits.  HEENT:  Pupils are equal, round and reactive to light and accommodation, Extra-ocular  movements full, Oropharyx clear, mucous membranes moist.  She has asymmetry of the face consistent with left sided Bell's palsy but this is improved after her surgery last November..  Neck:  No JVD, thyromegaly or lymphadenopathy  CV:  Regular rate and rhythm, no murmurs, rubs or gallops  Resp:  Lungs are clear to auscultation bilaterally without crackles  Breast: Status post left lumpectomy and revision as well as left sentinel lymph node biopsy.  She has some firmer tissue around the sites of her surgeries as well as what appears to be an old biopsy site.  No palpable mass lesions.  No left axillary adenopathy.  Right breast without mass lesions.  Right axilla without adenopathy.  Abd:  Soft, non-tender, non-distended, bowel sounds present, no organomegaly or masses.  Ext:  No clubbing, cyanosis, no lower extremity edema  Lymph:  No cervical, supraclavicular, axillary adenopathy  Neuro:  MS as above, CN II-XII intact, grossly non-focal exam      PATHOLOGY:    09/28/2022            FISH Analysis 09/28/2022      10/17/2022      10/26/2022      Genetic Testing 11/07/2022    Mammaprint 11/28/22          ENDOSCOPY:    Colonoscopy 10/16/17          IMAGING:    Bilateral Diagnostic Mammogram with Bilateral Breast Ultrasound ([facility]) 09/27/2022        Bilateral Breast MRI ([facility]) 09/30/2022      Mammoprint Results 11/28/22      DEXA Bone Density Axial (12/08/2022 14:11)  FINDINGS: The BMD measured at the AP spine L1-L4 is 0.808 gm/cm2 with a  T-score of -3.1.      IMPRESSION:  The patient is considered to be osteoporotic according to the  World Health Organization criteria. Fracture risk is high.     Mammo Diagnostic Digital Tomosynthesis Bilateral With CAD (05/25/2023 12:44)   FINDINGS: The breasts are heterogeneously dense, which may obscure small  masses.     Presumed postoperative changes are now noted in the left 12:00 region  from the patient's interval conservation surgery. Mild skin thickening  is also noted in the  left breast which is likely treatment related.  There are a few bilateral scattered benign-appearing calcifications. No  dominant mass, suspicious calcifications, or nonsurgical areas of  architectural distortion are seen in either breast.     IMPRESSION:  Incomplete examination as comparison with prior imaging of  the right breast is needed.        BI-RADS CATEGORY:   0, INCOMPLETE:  Need prior mammograms for comparison        RECOMMENDATION:  We will obtain the patient's prior outside mammographic  imaging for comparison. Also recommend 6 month follow-up diagnostic left  mammogram.    Addendum  Outside mammographic images dated 08/31/2022, 08/05/2022, and 07/23/2021  were compared to the exam performed at our facility on 05/25/2023. The  fibroglandular pattern of the right breast is stable in appearance.     FINAL ACR BI-RADS CATEGORY: 3, PROBABLY BENIGN     RECOMMENDATION: Recommend 6 month follow-up diagnostic left mammogram.     Mammo Diagnostic Digital Tomosynthesis Left With CAD (11/28/2023 10:22)   FINDINGS: The left breast heterogeneously dense, which may obscure small  masses.     Postoperative changes being followed in the left 12:00 region are stable  in appearance as is the remaining fibroglandular pattern. There are  stable scattered calcifications. Mild skin thickening is again noted and  likely treatment related. No new or suspicious findings are identified.     IMPRESSION:  Probably benign left mammographic findings. Recommend  continued follow-up.        BI-RADS CATEGORY: 3, PROBABLY BENIGN    Mammo Diagnostic Digital Tomosynthesis Bilateral With CAD (05/28/2024 10:05)   FINDINGS: There are scattered areas of fibroglandular density.     The bilateral fibroglandular pattern is stable in appearance including  postoperative changes being followed in the superior aspect of the left  breast. There are stable scattered calcifications. Mild skin thickening  is again noted involving the left breast which  is likely treatment  related. No new or suspicious findings are identified in either breast.     IMPRESSION:     1. Benign right mammographic findings.     2. Probably benign left mammographic findings. Recommend continued  follow-up.     BI-RADS CATEGORY: 3, PROBABLY BENIGN     RECOMMENDATION: Recommend additional 6-month follow-up diagnostic left  mammogram.    Mammo Diagnostic Digital Tomosynthesis Left With CAD (12/03/2024 10:21)   FINDINGS: There are scattered areas of fibroglandular density.     The fibroglandular pattern of the left breast is stable in appearance  including postoperative changes being followed. There are stable  scattered calcifications. No new or suspicious finding are identified in  the left breast     IMPRESSION:  Probably benign left mammographic findings. Recommend  continued follow-up        BI-RADS CATEGORY: 3, PROBABLY BENIGN        RECOMMENDATION: The patient will be due for her next bilateral  diagnostic mammogram after 5/28/2025     CAD was utilized.    DEXA Bone Density Axial (12/03/2024 10:20)   FINDINGS: The lowest T score was in the left femoral neck -2.0. This  corresponds with  osteopenia.  All of the other bone mineral densities acquired on today's exam on the  range of osteopenia     IMPRESSION:  Impression:  1. According to the World Health Organization definitions of  osteoporosis based on bone density, this patient's bone mineral density  is compatible with  osteopenia and the fracture risk is moderate.     2. Osteopenia in the left femur and lumbar spine      RECENT LABS:  Lab Results   Component Value Date    WBC 6.89 04/11/2025    HGB 13.9 04/11/2025    HCT 44.2 04/11/2025    MCV 95.1 04/11/2025    RDW 13.3 04/11/2025     04/11/2025    NEUTRORELPCT 57.4 04/11/2025    LYMPHORELPCT 30.6 04/11/2025    MONORELPCT 8.7 04/11/2025    EOSRELPCT 2.3 04/11/2025    BASORELPCT 0.9 04/11/2025    NEUTROABS 3.95 04/11/2025    LYMPHSABS 2.11 04/11/2025       Lab Results    Component Value Date     04/11/2025    K 3.9 04/11/2025    CO2 25.5 04/11/2025     04/11/2025    BUN 11 04/11/2025    CREATININE 0.66 04/11/2025    GLUCOSE 112 (H) 04/11/2025    CALCIUM 8.2 (L) 04/11/2025    ALKPHOS 73 04/11/2025    AST 19 04/11/2025    ALT 13 04/11/2025    BILITOT 0.5 04/11/2025    ALBUMIN 4.2 04/11/2025    PROTEINTOT 6.9 04/11/2025    MG 2.5 (H) 04/11/2025    PHOS 2.4 (L) 04/11/2025     Lab Results   Component Value Date    FERRITIN 129.40 11/15/2022    IRON 57 11/15/2022    TIBC 350 11/15/2022    LABIRON 16 (L) 11/15/2022    DDGRBIHJ51 594 11/15/2022    FOLATE 9.82 11/15/2022     Lab Results   Component Value Date    TSH 2.510 02/24/2025    HCIK11NN 60.6 02/24/2025     Lab Results   Component Value Date     26.2 (H) 11/15/2022     Lab Results   Component Value Date    LABCA2 31.1 11/15/2022           ASSESSMENT & PLAN:  Ubaldo Thomas is a very pleasant 59 y.o. female with recently diagnosed Stage IA (pT1bN0(sn)MX) moderately differentiated invasive ductal carcinoma of the L breast.  Tumor was 6 mm in maximal dimension with associated DCIS.  Initial margin positive for DCIS with revision margins now clear.  0/2 SLN involved.  Tumor was 88% 3+ ER+, 86 % 3+ VA+ and HER-2/Frances Negative (1+) with negative FISH testing.  Mammaprint Low Risk Luminal-Type A.    1.  Left Breast Cancer:  - S/p L Lumpectomy 10-17-22 with SLNBx with revision of margin on 10-26-22 for involvement with DCIS.  Surgical margins clear.    - Overall her tumor has favorable characteristics including strong hormonal positivity.  - Given tumor size just >5 mm, recommended we send genomic profiling to determine whether risk is sufficient to necessitate treatment with chemotherapy. Happily, testing returned as low risk so chemotherapy was not recommended.  - Returned to Dr. Claudia Mccrary for accelerated partial breast irradiation which she completed on 1/27/2023.  She did very well with her treatment.  - She  then required hormonal therapy given strongly hormonally driven cancer.  She is postmenopausal and has an intact uterus.  She had pre-existing osteoporosis with a T score of -3.1 at baseline.  Discussed the reason for hormonal therapy, and different options including treatment with an aromatase inhibitor versus tamoxifen.  Discussed the relative risks benefits and side effects of each, and after conversation, she decided to proceed with tamoxifen therapy.  She does understand that this carries with it a small but increased risk of development of endometrial cancer and she knows she will need to see her gynecologist once a year. Gynecological exam in July 2024 was normal.   -She is due for bilateral diagnostic mammogram 5/28/2025.  Will order this today.  - Exam and labs from today without cause for concern. Will continue Tamoxifen daily.  Refilled today.  - She will follow up with me in 3 months with CBCD, CMP, Vitamin D and TSH prior.      2.  Osteoporosis:  - Baseline DEXA scan performed 12/8/2022 showed osteoporosis with a T score of -3.1.  - She was started on Prolia along with calcium replacement and weekly high-dose vitamin D.  - Repeat DEXA scan was performed in December 2024 which showed marked improvement in her bone density with T-score of -2.0.  - Will continue Prolia.  - Emphasized the need to take calcium and discussed multiple different formulations so that she can choose the one that she dislikes the least  - Continue vitamin D replacement.  Will check level today.      3.  Anxiety / Depression:  - Doing much better on Lexapro 10 mg daily.  Euthymic today.    4.   H/o melanoma in situ RLE many years ago:  -  Continues to f/u with dermatology yearly.  Reports had exam there ([facility] [place] in [place]) in May 2024 with full skin exam and no concerning findings.  She plans to follow-up this coming May.    5.  Prophylaxis:  -  Ubaldo Thomas received 2024 flu vaccine.  -  Ubaldo  Killian Thomas received 4 doses COVID vaccine. and received bivalent booster.  She also received 2023 Fall Booster.  -  Ubaldo Thomas has had colonoscopy in 2018 performed by Dr. Montgomery. She says exam was normal without polyps. Repeat exam will be due in 2028.    6. Follow Up:  - Continue Tamoxifen daily.  Refill today.  - Continue Prolia q6 months with Ca/Vit D and weekly Vit D.  - Repeat vitamin D level today.  -Bilateral diagnostic mammogram due after 5/28/2025.  Will order this today.  -Return to see me in 3 months with CBC, CMP, TSH and vitamin D..      7.  ACO / KIARA/Other  Quality measures  -  Ubaldo Thomas received 2024 flu vaccine.  -  Ubaldo Thomas reports a pain score of 0.    -  Current outpatient and discharge medications have been reconciled for the patient.  Reviewed by: Nidhi Porras MD    I spent 30 minutes with Ubaldo Thomas today.  In the office today, more than 50% of this time was spent face-to-face with her  in counseling / coordination of care, reviewing her interim medical history and counseling on the current treatment plan.  All questions were answered to her satisfaction.           Electronically Signed by: Nidhi Porras MD

## 2025-04-11 ENCOUNTER — LAB (OUTPATIENT)
Dept: ONCOLOGY | Facility: CLINIC | Age: 60
End: 2025-04-11
Payer: COMMERCIAL

## 2025-04-11 ENCOUNTER — OFFICE VISIT (OUTPATIENT)
Dept: ONCOLOGY | Facility: CLINIC | Age: 60
End: 2025-04-11
Payer: COMMERCIAL

## 2025-04-11 VITALS
RESPIRATION RATE: 18 BRPM | WEIGHT: 154.8 LBS | DIASTOLIC BLOOD PRESSURE: 78 MMHG | SYSTOLIC BLOOD PRESSURE: 118 MMHG | HEART RATE: 87 BPM | OXYGEN SATURATION: 98 % | TEMPERATURE: 97.1 F | BODY MASS INDEX: 27.42 KG/M2

## 2025-04-11 DIAGNOSIS — M81.0 AGE-RELATED OSTEOPOROSIS WITHOUT CURRENT PATHOLOGICAL FRACTURE: Primary | ICD-10-CM

## 2025-04-11 DIAGNOSIS — D03.71 MELANOMA IN SITU OF RIGHT LOWER EXTREMITY INCLUDING HIP: ICD-10-CM

## 2025-04-11 DIAGNOSIS — Z78.0 POSTMENOPAUSAL: ICD-10-CM

## 2025-04-11 DIAGNOSIS — C50.212 MALIGNANT NEOPLASM OF UPPER-INNER QUADRANT OF LEFT BREAST IN FEMALE, ESTROGEN RECEPTOR POSITIVE: ICD-10-CM

## 2025-04-11 DIAGNOSIS — G51.0 BELL'S PALSY: ICD-10-CM

## 2025-04-11 DIAGNOSIS — Z17.0 MALIGNANT NEOPLASM OF UPPER-INNER QUADRANT OF LEFT BREAST IN FEMALE, ESTROGEN RECEPTOR POSITIVE: ICD-10-CM

## 2025-04-11 DIAGNOSIS — Z17.0 MALIGNANT NEOPLASM OF LEFT BREAST IN FEMALE, ESTROGEN RECEPTOR POSITIVE, UNSPECIFIED SITE OF BREAST: Primary | ICD-10-CM

## 2025-04-11 DIAGNOSIS — M81.0 AGE-RELATED OSTEOPOROSIS WITHOUT CURRENT PATHOLOGICAL FRACTURE: ICD-10-CM

## 2025-04-11 DIAGNOSIS — R53.83 FATIGUE, UNSPECIFIED TYPE: ICD-10-CM

## 2025-04-11 DIAGNOSIS — C50.912 MALIGNANT NEOPLASM OF LEFT BREAST IN FEMALE, ESTROGEN RECEPTOR POSITIVE, UNSPECIFIED SITE OF BREAST: Primary | ICD-10-CM

## 2025-04-11 LAB
ALBUMIN SERPL-MCNC: 4.2 G/DL (ref 3.5–5.2)
ALBUMIN/GLOB SERPL: 1.6 G/DL
ALP SERPL-CCNC: 73 U/L (ref 39–117)
ALT SERPL W P-5'-P-CCNC: 13 U/L (ref 1–33)
ANION GAP SERPL CALCULATED.3IONS-SCNC: 7.5 MMOL/L (ref 5–15)
AST SERPL-CCNC: 19 U/L (ref 1–32)
BASOPHILS # BLD AUTO: 0.06 10*3/MM3 (ref 0–0.2)
BASOPHILS NFR BLD AUTO: 0.9 % (ref 0–1.5)
BILIRUB SERPL-MCNC: 0.5 MG/DL (ref 0–1.2)
BUN SERPL-MCNC: 11 MG/DL (ref 8–23)
BUN/CREAT SERPL: 16.7 (ref 7–25)
CALCIUM SPEC-SCNC: 8.2 MG/DL (ref 8.6–10.5)
CHLORIDE SERPL-SCNC: 106 MMOL/L (ref 98–107)
CO2 SERPL-SCNC: 25.5 MMOL/L (ref 22–29)
CREAT SERPL-MCNC: 0.66 MG/DL (ref 0.57–1)
DEPRECATED RDW RBC AUTO: 46.7 FL (ref 37–54)
EGFRCR SERPLBLD CKD-EPI 2021: 100.6 ML/MIN/1.73
EOSINOPHIL # BLD AUTO: 0.16 10*3/MM3 (ref 0–0.4)
EOSINOPHIL NFR BLD AUTO: 2.3 % (ref 0.3–6.2)
ERYTHROCYTE [DISTWIDTH] IN BLOOD BY AUTOMATED COUNT: 13.3 % (ref 12.3–15.4)
GLOBULIN UR ELPH-MCNC: 2.7 GM/DL
GLUCOSE SERPL-MCNC: 112 MG/DL (ref 65–99)
HCT VFR BLD AUTO: 44.2 % (ref 34–46.6)
HGB BLD-MCNC: 13.9 G/DL (ref 12–15.9)
IMM GRANULOCYTES # BLD AUTO: 0.01 10*3/MM3 (ref 0–0.05)
IMM GRANULOCYTES NFR BLD AUTO: 0.1 % (ref 0–0.5)
LYMPHOCYTES # BLD AUTO: 2.11 10*3/MM3 (ref 0.7–3.1)
LYMPHOCYTES NFR BLD AUTO: 30.6 % (ref 19.6–45.3)
MAGNESIUM SERPL-MCNC: 2.5 MG/DL (ref 1.6–2.4)
MCH RBC QN AUTO: 29.9 PG (ref 26.6–33)
MCHC RBC AUTO-ENTMCNC: 31.4 G/DL (ref 31.5–35.7)
MCV RBC AUTO: 95.1 FL (ref 79–97)
MONOCYTES # BLD AUTO: 0.6 10*3/MM3 (ref 0.1–0.9)
MONOCYTES NFR BLD AUTO: 8.7 % (ref 5–12)
NEUTROPHILS NFR BLD AUTO: 3.95 10*3/MM3 (ref 1.7–7)
NEUTROPHILS NFR BLD AUTO: 57.4 % (ref 42.7–76)
NRBC BLD AUTO-RTO: 0 /100 WBC (ref 0–0.2)
PHOSPHATE SERPL-MCNC: 2.4 MG/DL (ref 2.5–4.5)
PLATELET # BLD AUTO: 267 10*3/MM3 (ref 140–450)
PMV BLD AUTO: 9.7 FL (ref 6–12)
POTASSIUM SERPL-SCNC: 3.9 MMOL/L (ref 3.5–5.2)
PROT SERPL-MCNC: 6.9 G/DL (ref 6–8.5)
RBC # BLD AUTO: 4.65 10*6/MM3 (ref 3.77–5.28)
SODIUM SERPL-SCNC: 139 MMOL/L (ref 136–145)
TSH SERPL DL<=0.05 MIU/L-ACNC: 2.14 UIU/ML (ref 0.27–4.2)
WBC NRBC COR # BLD AUTO: 6.89 10*3/MM3 (ref 3.4–10.8)

## 2025-04-11 PROCEDURE — 99214 OFFICE O/P EST MOD 30 MIN: CPT | Performed by: INTERNAL MEDICINE

## 2025-04-11 RX ORDER — TAMOXIFEN CITRATE 20 MG/1
20 TABLET ORAL DAILY
Qty: 90 TABLET | Refills: 3 | Status: SHIPPED | OUTPATIENT
Start: 2025-04-11

## 2025-04-11 NOTE — PROGRESS NOTES
Venipuncture Blood Specimen Collection  Venipuncture performed in left arm  by Marleny Cameron MA with good hemostasis. Patient tolerated the procedure well without complications.   04/11/25   Marleny Cameron MA

## 2025-04-12 LAB — 25(OH)D3 SERPL-MCNC: 56.4 NG/ML (ref 30–100)

## 2025-04-21 RX ORDER — ERGOCALCIFEROL 1.25 MG/1
50000 CAPSULE, LIQUID FILLED ORAL WEEKLY
Qty: 4 CAPSULE | Refills: 4 | Status: SHIPPED | OUTPATIENT
Start: 2025-04-21

## 2025-05-01 ENCOUNTER — TELEPHONE (OUTPATIENT)
Dept: ONCOLOGY | Facility: CLINIC | Age: 60
End: 2025-05-01
Payer: COMMERCIAL

## 2025-05-01 NOTE — TELEPHONE ENCOUNTER
Suzan from Dr. Hartley's office in [place], [place] called to speak with Dr. Porras nurse with questions about tooth extraction for Ubaldo.   Her call back number is [phone].

## 2025-05-01 NOTE — TELEPHONE ENCOUNTER
RN discussed typical timing of stopping/resuming Prolia injections with Suzan, who says patient is having a broken tooth extracted possibly in August.

## 2025-07-22 ENCOUNTER — TELEPHONE (OUTPATIENT)
Dept: ONCOLOGY | Facility: CLINIC | Age: 60
End: 2025-07-22
Payer: COMMERCIAL

## 2025-07-22 NOTE — TELEPHONE ENCOUNTER
Caller: Ubaldo Thomas    Relationship: Self    Best call back number:   Telephone Information:   Mobile [phone]     What was the call regarding: PT IS NEEDING TO R/S 7/29 APPT. PLEASE CB TO ADVISE.

## 2025-07-25 ENCOUNTER — HOSPITAL ENCOUNTER (OUTPATIENT)
Dept: MAMMOGRAPHY | Facility: HOSPITAL | Age: 60
Discharge: HOME | End: 2025-07-25
Admitting: INTERNAL MEDICINE
Payer: COMMERCIAL

## 2025-07-25 DIAGNOSIS — Z17.0 MALIGNANT NEOPLASM OF UPPER-INNER QUADRANT OF LEFT BREAST IN FEMALE, ESTROGEN RECEPTOR POSITIVE: ICD-10-CM

## 2025-07-25 DIAGNOSIS — Z78.0 POSTMENOPAUSAL: ICD-10-CM

## 2025-07-25 DIAGNOSIS — C50.212 MALIGNANT NEOPLASM OF UPPER-INNER QUADRANT OF LEFT BREAST IN FEMALE, ESTROGEN RECEPTOR POSITIVE: ICD-10-CM

## 2025-07-25 DIAGNOSIS — M81.0 AGE-RELATED OSTEOPOROSIS WITHOUT CURRENT PATHOLOGICAL FRACTURE: ICD-10-CM

## 2025-07-25 DIAGNOSIS — R53.83 FATIGUE, UNSPECIFIED TYPE: ICD-10-CM

## 2025-07-25 PROCEDURE — 77066 DX MAMMO INCL CAD BI: CPT

## 2025-07-25 PROCEDURE — G0279 TOMOSYNTHESIS, MAMMO: HCPCS

## 2025-07-28 ENCOUNTER — TRANSCRIBE ORDERS (OUTPATIENT)
Dept: ADMINISTRATIVE | Facility: HOSPITAL | Age: 60
End: 2025-07-28
Payer: COMMERCIAL

## 2025-07-28 DIAGNOSIS — R31.9 HEMATURIA SYNDROME: Primary | ICD-10-CM

## 2025-08-01 ENCOUNTER — LAB (OUTPATIENT)
Dept: ONCOLOGY | Facility: CLINIC | Age: 60
End: 2025-08-01
Payer: COMMERCIAL

## 2025-08-01 ENCOUNTER — OFFICE VISIT (OUTPATIENT)
Dept: ONCOLOGY | Facility: CLINIC | Age: 60
End: 2025-08-01
Payer: COMMERCIAL

## 2025-08-01 VITALS
BODY MASS INDEX: 26.61 KG/M2 | RESPIRATION RATE: 18 BRPM | OXYGEN SATURATION: 99 % | SYSTOLIC BLOOD PRESSURE: 128 MMHG | TEMPERATURE: 98.2 F | DIASTOLIC BLOOD PRESSURE: 78 MMHG | WEIGHT: 150.2 LBS | HEART RATE: 90 BPM | HEIGHT: 63 IN

## 2025-08-01 DIAGNOSIS — D03.71 MELANOMA IN SITU OF RIGHT LOWER EXTREMITY INCLUDING HIP: ICD-10-CM

## 2025-08-01 DIAGNOSIS — Z17.0 MALIGNANT NEOPLASM OF UPPER-INNER QUADRANT OF LEFT BREAST IN FEMALE, ESTROGEN RECEPTOR POSITIVE: Primary | ICD-10-CM

## 2025-08-01 DIAGNOSIS — R53.83 FATIGUE, UNSPECIFIED TYPE: ICD-10-CM

## 2025-08-01 DIAGNOSIS — Z17.0 MALIGNANT NEOPLASM OF UPPER-INNER QUADRANT OF LEFT BREAST IN FEMALE, ESTROGEN RECEPTOR POSITIVE: ICD-10-CM

## 2025-08-01 DIAGNOSIS — C50.212 MALIGNANT NEOPLASM OF UPPER-INNER QUADRANT OF LEFT BREAST IN FEMALE, ESTROGEN RECEPTOR POSITIVE: Primary | ICD-10-CM

## 2025-08-01 DIAGNOSIS — C50.212 MALIGNANT NEOPLASM OF UPPER-INNER QUADRANT OF LEFT BREAST IN FEMALE, ESTROGEN RECEPTOR POSITIVE: ICD-10-CM

## 2025-08-01 DIAGNOSIS — Z17.0 MALIGNANT NEOPLASM OF LEFT BREAST IN FEMALE, ESTROGEN RECEPTOR POSITIVE, UNSPECIFIED SITE OF BREAST: ICD-10-CM

## 2025-08-01 DIAGNOSIS — C50.912 MALIGNANT NEOPLASM OF LEFT BREAST IN FEMALE, ESTROGEN RECEPTOR POSITIVE, UNSPECIFIED SITE OF BREAST: ICD-10-CM

## 2025-08-01 DIAGNOSIS — E55.9 VITAMIN D DEFICIENCY: ICD-10-CM

## 2025-08-01 DIAGNOSIS — M81.0 AGE-RELATED OSTEOPOROSIS WITHOUT CURRENT PATHOLOGICAL FRACTURE: ICD-10-CM

## 2025-08-01 LAB
ALBUMIN SERPL-MCNC: 4.2 G/DL (ref 3.5–5.2)
ALBUMIN/GLOB SERPL: 1.7 G/DL
ALP SERPL-CCNC: 54 U/L (ref 39–117)
ALT SERPL W P-5'-P-CCNC: 14 U/L (ref 1–33)
ANION GAP SERPL CALCULATED.3IONS-SCNC: 11.7 MMOL/L (ref 5–15)
AST SERPL-CCNC: 28 U/L (ref 1–32)
BASOPHILS # BLD AUTO: 0.06 10*3/MM3 (ref 0–0.2)
BASOPHILS NFR BLD AUTO: 0.7 % (ref 0–1.5)
BILIRUB SERPL-MCNC: 0.5 MG/DL (ref 0–1.2)
BUN SERPL-MCNC: 16.3 MG/DL (ref 8–23)
BUN/CREAT SERPL: 19 (ref 7–25)
CALCIUM SPEC-SCNC: 8.9 MG/DL (ref 8.6–10.5)
CHLORIDE SERPL-SCNC: 107 MMOL/L (ref 98–107)
CO2 SERPL-SCNC: 24.3 MMOL/L (ref 22–29)
CREAT SERPL-MCNC: 0.86 MG/DL (ref 0.57–1)
DEPRECATED RDW RBC AUTO: 44.3 FL (ref 37–54)
EGFRCR SERPLBLD CKD-EPI 2021: 77 ML/MIN/1.73
EOSINOPHIL # BLD AUTO: 0.12 10*3/MM3 (ref 0–0.4)
EOSINOPHIL NFR BLD AUTO: 1.3 % (ref 0.3–6.2)
ERYTHROCYTE [DISTWIDTH] IN BLOOD BY AUTOMATED COUNT: 12.9 % (ref 12.3–15.4)
GLOBULIN UR ELPH-MCNC: 2.5 GM/DL
GLUCOSE SERPL-MCNC: 93 MG/DL (ref 65–99)
HCT VFR BLD AUTO: 42.4 % (ref 34–46.6)
HGB BLD-MCNC: 14.1 G/DL (ref 12–15.9)
IMM GRANULOCYTES # BLD AUTO: 0.03 10*3/MM3 (ref 0–0.05)
IMM GRANULOCYTES NFR BLD AUTO: 0.3 % (ref 0–0.5)
LYMPHOCYTES # BLD AUTO: 2.2 10*3/MM3 (ref 0.7–3.1)
LYMPHOCYTES NFR BLD AUTO: 24.2 % (ref 19.6–45.3)
MCH RBC QN AUTO: 31 PG (ref 26.6–33)
MCHC RBC AUTO-ENTMCNC: 33.3 G/DL (ref 31.5–35.7)
MCV RBC AUTO: 93.2 FL (ref 79–97)
MONOCYTES # BLD AUTO: 0.78 10*3/MM3 (ref 0.1–0.9)
MONOCYTES NFR BLD AUTO: 8.6 % (ref 5–12)
NEUTROPHILS NFR BLD AUTO: 5.9 10*3/MM3 (ref 1.7–7)
NEUTROPHILS NFR BLD AUTO: 64.9 % (ref 42.7–76)
NRBC BLD AUTO-RTO: 0 /100 WBC (ref 0–0.2)
PLATELET # BLD AUTO: 294 10*3/MM3 (ref 140–450)
PMV BLD AUTO: 9.8 FL (ref 6–12)
POTASSIUM SERPL-SCNC: 3.8 MMOL/L (ref 3.5–5.2)
PROT SERPL-MCNC: 6.7 G/DL (ref 6–8.5)
RBC # BLD AUTO: 4.55 10*6/MM3 (ref 3.77–5.28)
SODIUM SERPL-SCNC: 143 MMOL/L (ref 136–145)
TSH SERPL DL<=0.05 MIU/L-ACNC: 2.4 UIU/ML (ref 0.27–4.2)
WBC NRBC COR # BLD AUTO: 9.09 10*3/MM3 (ref 3.4–10.8)

## 2025-08-01 PROCEDURE — 82306 VITAMIN D 25 HYDROXY: CPT | Performed by: INTERNAL MEDICINE

## 2025-08-01 PROCEDURE — 80050 GENERAL HEALTH PANEL: CPT | Performed by: INTERNAL MEDICINE

## 2025-08-01 PROCEDURE — 99214 OFFICE O/P EST MOD 30 MIN: CPT | Performed by: INTERNAL MEDICINE

## 2025-08-01 RX ORDER — TAMOXIFEN CITRATE 20 MG/1
20 TABLET ORAL DAILY
Qty: 90 TABLET | Refills: 3 | Status: SHIPPED | OUTPATIENT
Start: 2025-08-01

## 2025-08-01 RX ORDER — ERGOCALCIFEROL 1.25 MG/1
50000 CAPSULE, LIQUID FILLED ORAL WEEKLY
Qty: 12 CAPSULE | Refills: 3 | Status: SHIPPED | OUTPATIENT
Start: 2025-08-01

## 2025-08-01 NOTE — PROGRESS NOTES
Venipuncture Blood Specimen Collection  Venipuncture performed in left arm by Barbara Castillo MA with good hemostasis. Patient tolerated the procedure well without complications.   08/01/25   Barbara Castillo MA

## 2025-08-02 LAB — 25(OH)D3 SERPL-MCNC: 68.8 NG/ML (ref 30–100)
